# Patient Record
Sex: FEMALE | Race: WHITE | NOT HISPANIC OR LATINO | Employment: FULL TIME | URBAN - METROPOLITAN AREA
[De-identification: names, ages, dates, MRNs, and addresses within clinical notes are randomized per-mention and may not be internally consistent; named-entity substitution may affect disease eponyms.]

---

## 2022-05-09 ENCOUNTER — TELEPHONE (OUTPATIENT)
Dept: GASTROENTEROLOGY | Facility: CLINIC | Age: 62
End: 2022-05-09

## 2022-05-09 ENCOUNTER — PREP FOR SURGERY (OUTPATIENT)
Dept: OTHER | Facility: HOSPITAL | Age: 62
End: 2022-05-09

## 2022-05-09 ENCOUNTER — CLINICAL SUPPORT (OUTPATIENT)
Dept: GASTROENTEROLOGY | Facility: CLINIC | Age: 62
End: 2022-05-09

## 2022-05-09 DIAGNOSIS — Z12.11 COLON CANCER SCREENING: Primary | ICD-10-CM

## 2022-05-09 DIAGNOSIS — K21.9 CHRONIC GERD: ICD-10-CM

## 2022-05-09 RX ORDER — CETIRIZINE HYDROCHLORIDE 10 MG/1
10 TABLET ORAL DAILY
COMMUNITY

## 2022-05-09 RX ORDER — SODIUM, POTASSIUM,MAG SULFATES 17.5-3.13G
2 SOLUTION, RECONSTITUTED, ORAL ORAL TAKE AS DIRECTED
Qty: 354 ML | Refills: 0 | Status: SHIPPED | OUTPATIENT
Start: 2022-05-09 | End: 2022-09-01 | Stop reason: SDUPTHER

## 2022-05-09 RX ORDER — MELATONIN
1000 DAILY
COMMUNITY

## 2022-05-09 RX ORDER — MULTIPLE VITAMINS W/ MINERALS TAB 9MG-400MCG
1 TAB ORAL DAILY
COMMUNITY

## 2022-05-09 RX ORDER — CALCIUM CARBONATE 200(500)MG
1 TABLET,CHEWABLE ORAL DAILY
COMMUNITY

## 2022-05-09 NOTE — TELEPHONE ENCOUNTER
Heather Swanson  REASON FOR CALL encounter for colon screening/ egd  SENT IN PREP suprep  No past medical history on file.  Allergies   Allergen Reactions   • Diflucan [Fluconazole] Unknown - High Severity   • Morphine Headache     Past Surgical History:   Procedure Laterality Date   • COLONOSCOPY       Social History     Socioeconomic History   • Marital status:    Tobacco Use   • Smoking status: Never Smoker   • Smokeless tobacco: Never Used   Vaping Use   • Vaping Use: Never used   Substance and Sexual Activity   • Alcohol use: Defer   • Drug use: Defer   • Sexual activity: Defer     No family history on file.    Current Outpatient Medications:   •  calcium carbonate (TUMS) 500 MG chewable tablet, Chew 1 tablet Daily., Disp: , Rfl:   •  cetirizine (zyrTEC) 10 MG tablet, Take 10 mg by mouth Daily., Disp: , Rfl:   •  cholecalciferol (VITAMIN D3) 25 MCG (1000 UT) tablet, Take 1,000 Units by mouth Daily., Disp: , Rfl:   •  multivitamin with minerals tablet tablet, Take 1 tablet by mouth Daily., Disp: , Rfl:

## 2022-05-09 NOTE — PROGRESS NOTES
SPOKE WITH PT ON A DATE FOR COLONOSCOPY/EGD OF 22 . MADE SURE CHART WAS UP TO DATE. WENT OVER PREP AND MAILED OUT INSTRUCTIONS. PUT IN ORDER FOR COLON/EGD AND SENT PREP TO PHARMACY  Answers for HPI/ROS submitted by the patient on 2022  Please describe your symptoms.: Time for a colonoscopy, I have family history of colon cancer. I am also experiencing some heart burns and would like to have an upper scope ran as well since my father had a lot of indigestion issues and  of pancreatic cancer.  Have you had these symptoms before?: Yes  How long have you been having these symptoms?: Greater than 2 weeks  Please list any medications you are currently taking for this condition.: Zyrtec for allergies and vitamin D and a multi vitamin.  What is the primary reason for your visit?: Other

## 2022-09-01 ENCOUNTER — TELEPHONE (OUTPATIENT)
Dept: GASTROENTEROLOGY | Facility: CLINIC | Age: 62
End: 2022-09-01

## 2022-09-21 RX ORDER — VITAMIN E 268 MG
400 CAPSULE ORAL DAILY
COMMUNITY

## 2022-09-26 ENCOUNTER — ANESTHESIA (OUTPATIENT)
Dept: GASTROENTEROLOGY | Facility: HOSPITAL | Age: 62
End: 2022-09-26

## 2022-09-26 ENCOUNTER — HOSPITAL ENCOUNTER (OUTPATIENT)
Facility: HOSPITAL | Age: 62
Setting detail: HOSPITAL OUTPATIENT SURGERY
Discharge: HOME | End: 2022-09-26
Attending: INTERNAL MEDICINE | Admitting: INTERNAL MEDICINE

## 2022-09-26 ENCOUNTER — ANESTHESIA EVENT (OUTPATIENT)
Dept: GASTROENTEROLOGY | Facility: HOSPITAL | Age: 62
End: 2022-09-26

## 2022-09-26 VITALS
BODY MASS INDEX: 24.31 KG/M2 | TEMPERATURE: 98.5 F | RESPIRATION RATE: 18 BRPM | HEIGHT: 64 IN | DIASTOLIC BLOOD PRESSURE: 55 MMHG | SYSTOLIC BLOOD PRESSURE: 127 MMHG | OXYGEN SATURATION: 100 % | HEART RATE: 78 BPM | WEIGHT: 142.42 LBS

## 2022-09-26 DIAGNOSIS — Z12.11 COLON CANCER SCREENING: ICD-10-CM

## 2022-09-26 DIAGNOSIS — K21.9 CHRONIC GERD: ICD-10-CM

## 2022-09-26 PROCEDURE — 45385 COLONOSCOPY W/LESION REMOVAL: CPT | Performed by: INTERNAL MEDICINE

## 2022-09-26 PROCEDURE — 25010000002 PROPOFOL 10 MG/ML EMULSION: Performed by: NURSE ANESTHETIST, CERTIFIED REGISTERED

## 2022-09-26 PROCEDURE — 88305 TISSUE EXAM BY PATHOLOGIST: CPT | Performed by: INTERNAL MEDICINE

## 2022-09-26 PROCEDURE — 43239 EGD BIOPSY SINGLE/MULTIPLE: CPT | Performed by: INTERNAL MEDICINE

## 2022-09-26 RX ORDER — PANTOPRAZOLE SODIUM 20 MG/1
20 TABLET, DELAYED RELEASE ORAL DAILY
Qty: 90 TABLET | Refills: 0 | Status: SHIPPED | OUTPATIENT
Start: 2022-09-26 | End: 2022-11-21

## 2022-09-26 RX ORDER — SODIUM CHLORIDE, SODIUM LACTATE, POTASSIUM CHLORIDE, CALCIUM CHLORIDE 600; 310; 30; 20 MG/100ML; MG/100ML; MG/100ML; MG/100ML
1000 INJECTION, SOLUTION INTRAVENOUS CONTINUOUS
Status: DISCONTINUED | OUTPATIENT
Start: 2022-09-26 | End: 2022-09-26 | Stop reason: HOSPADM

## 2022-09-26 RX ORDER — PANTOPRAZOLE SODIUM 20 MG/1
20 TABLET, DELAYED RELEASE ORAL DAILY
Qty: 30 TABLET | Refills: 1 | Status: SHIPPED | OUTPATIENT
Start: 2022-09-26 | End: 2022-09-26

## 2022-09-26 RX ORDER — SODIUM CHLORIDE, SODIUM LACTATE, POTASSIUM CHLORIDE, CALCIUM CHLORIDE 600; 310; 30; 20 MG/100ML; MG/100ML; MG/100ML; MG/100ML
30 INJECTION, SOLUTION INTRAVENOUS CONTINUOUS
Status: DISCONTINUED | OUTPATIENT
Start: 2022-09-26 | End: 2022-09-26 | Stop reason: HOSPADM

## 2022-09-26 RX ORDER — LIDOCAINE HYDROCHLORIDE 20 MG/ML
INJECTION, SOLUTION EPIDURAL; INFILTRATION; INTRACAUDAL; PERINEURAL AS NEEDED
Status: DISCONTINUED | OUTPATIENT
Start: 2022-09-26 | End: 2022-09-26 | Stop reason: SURG

## 2022-09-26 RX ORDER — PROPOFOL 10 MG/ML
VIAL (ML) INTRAVENOUS AS NEEDED
Status: DISCONTINUED | OUTPATIENT
Start: 2022-09-26 | End: 2022-09-26 | Stop reason: SURG

## 2022-09-26 RX ADMIN — LIDOCAINE HYDROCHLORIDE 100 MG: 20 INJECTION, SOLUTION EPIDURAL; INFILTRATION; INTRACAUDAL; PERINEURAL at 07:26

## 2022-09-26 RX ADMIN — PROPOFOL 150 MCG/KG/MIN: 10 INJECTION, EMULSION INTRAVENOUS at 07:26

## 2022-09-26 RX ADMIN — PROPOFOL 100 MG: 10 INJECTION, EMULSION INTRAVENOUS at 07:26

## 2022-09-26 RX ADMIN — SODIUM CHLORIDE, POTASSIUM CHLORIDE, SODIUM LACTATE AND CALCIUM CHLORIDE 1000 ML: 600; 310; 30; 20 INJECTION, SOLUTION INTRAVENOUS at 06:35

## 2022-09-26 NOTE — H&P
"Pre Procedure History & Physical    Chief Complaint:   GERD, screening colonoscopy    Subjective     HPI:   61 yo F here for eval of GERD, screening colonoscopy.    Past Medical History:   Past Medical History:   Diagnosis Date   • Acid reflux    • Migraines        Past Surgical History:  Past Surgical History:   Procedure Laterality Date   • BLADDER REPAIR     • COLONOSCOPY     • ENDOSCOPY     • FOOT SURGERY Left    • HERNIA REPAIR     • HYSTERECTOMY Right    • WRIST SURGERY Left        Family History:  History reviewed. No pertinent family history.    Social History:   reports that she has never smoked. She has never used smokeless tobacco. Alcohol use questions deferred to the physician. Drug use questions deferred to the physician.    Medications:   Medications Prior to Admission   Medication Sig Dispense Refill Last Dose   • cetirizine (zyrTEC) 10 MG tablet Take 10 mg by mouth Daily.   9/25/2022   • calcium carbonate (TUMS) 500 MG chewable tablet Chew 1 tablet Daily.   9/24/2022   • cholecalciferol (VITAMIN D3) 25 MCG (1000 UT) tablet Take 1,000 Units by mouth Daily.   9/21/2022   • multivitamin with minerals tablet tablet Take 1 tablet by mouth Daily.   9/21/2022   • vitamin E 400 UNIT capsule Take 400 Units by mouth Daily.   9/21/2022       Allergies:  Diflucan [fluconazole] and Morphine    ROS:    Pertinent items are noted in HPI     Objective     Blood pressure 138/86, pulse 62, temperature 98.5 °F (36.9 °C), temperature source Temporal, resp. rate 16, height 162.6 cm (64\"), weight 64.6 kg (142 lb 6.7 oz), SpO2 99 %.    Physical Exam   Constitutional: Pt is oriented to person, place, and time and well-developed, well-nourished, and in no distress.   Mouth/Throat: Oropharynx is clear and moist.   Neck: Normal range of motion.   Cardiovascular: Normal rate, regular rhythm and normal heart sounds.    Pulmonary/Chest: Effort normal and breath sounds normal.   Abdominal: Soft. Nontender  Skin: Skin is warm and " dry.   Psychiatric: Mood, memory, affect and judgment normal.     Assessment & Plan     Diagnosis:  GERD, screening colonoscopy    Anticipated Surgical Procedure:  EGD/colonoscopy    The risks, benefits, and alternatives of this procedure have been discussed with the patient or the responsible party- the patient understands and agrees to proceed.

## 2022-09-26 NOTE — ANESTHESIA PREPROCEDURE EVALUATION
Anesthesia Evaluation     Patient summary reviewed and Nursing notes reviewed   no history of anesthetic complications:  NPO Solid Status: > 8 hours  NPO Liquid Status: > 2 hours           Airway   Mallampati: II  TM distance: >3 FB  Neck ROM: full  No difficulty expected  Dental      Pulmonary - negative pulmonary ROS and normal exam    breath sounds clear to auscultation  Cardiovascular - negative cardio ROS and normal exam  Exercise tolerance: good (4-7 METS)    Rhythm: regular  Rate: normal        Neuro/Psych  (+) headaches,    GI/Hepatic/Renal/Endo    (+)  GERD well controlled,      Musculoskeletal (-) negative ROS    Abdominal    Substance History - negative use     OB/GYN negative ob/gyn ROS         Other - negative ROS       ROS/Med Hx Other: PAT Nursing Notes unavailable.                   Anesthesia Plan    ASA 1     general     (Total IV Anesthesia    Patient understands anesthesia not responsible for dental damage.  )  intravenous induction     Anesthetic plan, risks, benefits, and alternatives have been provided, discussed and informed consent has been obtained with: patient.    Plan discussed with CRNA.        CODE STATUS:

## 2022-09-26 NOTE — ANESTHESIA POSTPROCEDURE EVALUATION
Patient: Heather Swanson    Procedure Summary     Date: 09/26/22 Room / Location: [facility] END[facility]COPY 1 / [facility]    Anesthesia Start: 0726 Anesthesia Stop: 0754    Procedures:       ESOPHAGOGASTRODUODENOSCOPY WITH BIOPSIES (N/A )      COLONOSCOPY WITH POLYPECTOMY, CAUTERY (N/A ) Diagnosis:       Colon cancer screening      Chronic GERD      (Colon cancer screening [Z12.11])      (Chronic GERD [K21.9])    Surgeons: Krystin Solorzano MD Provider: Mg Mary MD    Anesthesia Type: general ASA Status: 1          Anesthesia Type: general    Vitals  Vitals Value Taken Time   /55 09/26/22 0813   Temp 36.9 °C (98.5 °F) 09/26/22 0813   Pulse 78 09/26/22 0813   Resp 18 09/26/22 0813   SpO2 100 % 09/26/22 0813           Post Anesthesia Care and Evaluation    Patient location during evaluation: bedside  Patient participation: complete - patient participated  Level of consciousness: awake and alert  Pain management: adequate    Airway patency: patent  Anesthetic complications: No anesthetic complications  PONV Status: none  Cardiovascular status: acceptable  Respiratory status: acceptable  Hydration status: acceptable    Comments: An Anesthesiologist personally participated in the most demanding procedures (including induction and emergence if applicable) in the anesthesia plan, monitored the course of anesthesia administration at frequent intervals and remained physically present and available for immediate diagnosis and treatment of emergencies.

## 2022-09-27 LAB
CYTO UR: NORMAL
LAB AP CASE REPORT: NORMAL
LAB AP CLINICAL INFORMATION: NORMAL
PATH REPORT.FINAL DX SPEC: NORMAL
PATH REPORT.GROSS SPEC: NORMAL

## 2022-09-28 ENCOUNTER — TELEPHONE (OUTPATIENT)
Dept: GASTROENTEROLOGY | Facility: CLINIC | Age: 62
End: 2022-09-28

## 2022-09-28 NOTE — TELEPHONE ENCOUNTER
Spoke to pt and informed of Lori BRANHAM result note and recommendations. Verified that pt picked up new Rx of Protonix. F/u scheduled on 03/09/2023. Pt verified understanding.     5 year colon recall placed.

## 2022-09-28 NOTE — TELEPHONE ENCOUNTER
----- Message from YONAS Pineda sent at 9/27/2022  1:49 PM EDT -----  Biopsies are consistent with reflux esophagitis.  Continue current PPI therapy and schedule for follow-up.  Place in recall for repeat colonoscopy in 5 years.

## 2022-11-21 RX ORDER — PANTOPRAZOLE SODIUM 20 MG/1
20 TABLET, DELAYED RELEASE ORAL DAILY
Qty: 90 TABLET | Refills: 0 | Status: SHIPPED | OUTPATIENT
Start: 2022-11-21 | End: 2023-03-09 | Stop reason: SDUPTHER

## 2022-12-27 ENCOUNTER — TELEPHONE (OUTPATIENT)
Dept: ORTHOPEDIC SURGERY | Facility: CLINIC | Age: 62
End: 2022-12-27

## 2022-12-27 ENCOUNTER — OFFICE VISIT (OUTPATIENT)
Dept: ORTHOPEDIC SURGERY | Facility: CLINIC | Age: 62
End: 2022-12-27

## 2022-12-27 VITALS — HEART RATE: 76 BPM | BODY MASS INDEX: 24.24 KG/M2 | WEIGHT: 142 LBS | HEIGHT: 64 IN | OXYGEN SATURATION: 97 %

## 2022-12-27 DIAGNOSIS — S63.501A SPRAIN OF RIGHT WRIST, INITIAL ENCOUNTER: ICD-10-CM

## 2022-12-27 DIAGNOSIS — S52.124A CLOSED NONDISPLACED FRACTURE OF HEAD OF RIGHT RADIUS, INITIAL ENCOUNTER: Primary | ICD-10-CM

## 2022-12-27 PROCEDURE — 99203 OFFICE O/P NEW LOW 30 MIN: CPT | Performed by: ORTHOPAEDIC SURGERY

## 2022-12-27 RX ORDER — VALACYCLOVIR HYDROCHLORIDE 500 MG/1
TABLET, FILM COATED ORAL
COMMUNITY
Start: 2022-12-02

## 2022-12-27 RX ORDER — RIZATRIPTAN BENZOATE 10 MG/1
TABLET, ORALLY DISINTEGRATING ORAL
COMMUNITY
Start: 2022-12-02

## 2022-12-27 RX ORDER — DICLOFENAC SODIUM 75 MG/1
75 TABLET, DELAYED RELEASE ORAL 2 TIMES DAILY
Qty: 60 TABLET | Refills: 1 | Status: SHIPPED | OUTPATIENT
Start: 2022-12-27

## 2022-12-27 NOTE — TELEPHONE ENCOUNTER
Caller: CONTRERAS OLIVAREZ    Relationship to patient: SELF    Best call back number: [phone]    Chief complaint: RIGHT ARM FRACTURE    Type of visit: NON [facility] ER FOLLOW UP CARE    Requested date: 12/27/2022         Additional notes:PATIENT STATES SHE WAS SEEN AT [facility] ED ON 12/25/2022/  STATES RADIAL FRACTURE    HUB ATTEMPTED TO W/T NO ANSWER

## 2022-12-27 NOTE — TELEPHONE ENCOUNTER
I informed the patient we do not have any providers in the office this week and recommended [facility] in [place] I provided their telephone number and advised they do not open until 8:30am. [phone]    I informed her she will need to contact [facility] and have her images put on a disc and take that with her to the appointment since we are unable to access images only reports.     Patient voiced understanding.

## 2022-12-27 NOTE — PROGRESS NOTES
"Chief Complaint  Initial Evaluation of the Right Arm     Subjective      Heather Swanson presents to [facility] for initial evaluation of the right arm. She fell going outside on the snow and ice. She has no problems with the right shoulder.  She has swelling to elbow and pain from the elbow down to the wrist.  Her injury happened on 12/25/22.     Allergies   Allergen Reactions   • Diflucan [Fluconazole] Unknown - High Severity   • Morphine Headache        Social History     Socioeconomic History   • Marital status:    Tobacco Use   • Smoking status: Never   • Smokeless tobacco: Never   Vaping Use   • Vaping Use: Never used   Substance and Sexual Activity   • Alcohol use: Defer   • Drug use: Defer   • Sexual activity: Defer        Review of Systems     Objective   Vital Signs:   Pulse 76   Ht 162.6 cm (64\")   Wt 64.4 kg (142 lb)   SpO2 97%   BMI 24.37 kg/m²       Physical Exam  Constitutional:       Appearance: Normal appearance. Patient is well-developed and normal weight.   HENT:      Head: Normocephalic.      Right Ear: Hearing and external ear normal.      Left Ear: Hearing and external ear normal.      Nose: Nose normal.   Eyes:      Conjunctiva/sclera: Conjunctivae normal.   Cardiovascular:      Rate and Rhythm: Normal rate.   Pulmonary:      Effort: Pulmonary effort is normal.      Breath sounds: No wheezing or rales.   Abdominal:      Palpations: Abdomen is soft.      Tenderness: There is no abdominal tenderness.   Musculoskeletal:      Cervical back: Normal range of motion.   Skin:     Findings: No rash.   Neurological:      Mental Status: Patient is alert and oriented to person, place, and time.   Psychiatric:         Mood and Affect: Mood and affect normal.         Judgment: Judgment normal.       Ortho Exam      RIGHT ARM Full , thumb opposition, MCP flexors, DIP flexors and PIP flexors. Radial pulse 2+. Ulnar pulse 2+. Sensation intact. Neurovascular Intact. " Moderate swelling to the elbow.       Procedures      Imaging Results (Most Recent)     None           Result Review :         XR wrist 2 views right    Result Date: 2022  Narrative: RIGHT ELBOW HISTORY: Pain, fall. COMPARISON: None. FINDINGS:  A two view exam demonstrates no dislocation. The joint spaces demonstrates demonstrate anterior and superior displacement of the anterior fat pad of the elbow, suggesting effusion. On both views there is step-off of the articular surface of the radial head suggesting fracture. No soft tissue abnormality is seen.    Impression: Suspected, mildly displaced fracture of the right radial head with articular surface involvement and elbow effusion. RIGHT WRIST HISTORY: Pain, fall. COMPARISON: None. FINDINGS:  A two view exam demonstrates no acute fracture or dislocation. The joint spaces appear unremarkable. No soft tissue abnormality is seen. IMPRESSION: No acute bony abnormality. : 1960 Images reviewed, interpreted, and dictated by Eulalia Quinonez MD    XRAY, ELBOW, 2 VIEWS, RIGHT    Result Date: 2022  Narrative: RIGHT ELBOW HISTORY: Pain, fall. COMPARISON: None. FINDINGS:  A two view exam demonstrates no dislocation. The joint spaces demonstrates demonstrate anterior and superior displacement of the anterior fat pad of the elbow, suggesting effusion. On both views there is step-off of the articular surface of the radial head suggesting fracture. No soft tissue abnormality is seen.    Impression: Suspected, mildly displaced fracture of the right radial head with articular surface involvement and elbow effusion. RIGHT WRIST HISTORY: Pain, fall. COMPARISON: None. FINDINGS:  A two view exam demonstrates no acute fracture or dislocation. The joint spaces appear unremarkable. No soft tissue abnormality is seen. IMPRESSION: No acute bony abnormality. : 1960 Images reviewed, interpreted, and dictated by Eulalia Quinonez MD         Assessment and Plan     Diagnoses  and all orders for this visit:    1. Closed nondisplaced fracture of head of right radius, initial encounter (Primary)    2. Sprain of right wrist, initial encounter        Discussed the treatment plan with the patient. Discussed the treatment options with the patient, operative vs non-operative. Discussed conservative measures as exercises, anti-inflammatory and therapy. X ray next visit of right elbow and right wrist with a scaphoid view. Stop ibuprofen and start Voltaren.   Patient decided to continue with sling and did not want a splint.     Call or return if worsening symptoms.    Follow Up     7-10 days     Patient was given instructions and counseling regarding her condition or for health maintenance advice. Please see specific information pulled into the AVS if appropriate.     Scribed for Amadeo Stevens MD by Caro Dye MA.  12/27/22   15:13 EST    I have personally performed the services described in this document as scribed by the above individual and it is both accurate and complete. Amadeo Stevens MD 12/27/22

## 2023-01-06 ENCOUNTER — OFFICE VISIT (OUTPATIENT)
Dept: ORTHOPEDIC SURGERY | Facility: CLINIC | Age: 63
End: 2023-01-06
Payer: COMMERCIAL

## 2023-01-06 VITALS — HEIGHT: 64 IN | BODY MASS INDEX: 25.3 KG/M2 | WEIGHT: 148.2 LBS | HEART RATE: 78 BPM | OXYGEN SATURATION: 98 %

## 2023-01-06 DIAGNOSIS — M25.521 RIGHT ELBOW PAIN: ICD-10-CM

## 2023-01-06 DIAGNOSIS — M25.531 RIGHT WRIST PAIN: Primary | ICD-10-CM

## 2023-01-06 DIAGNOSIS — S52.124D CLOSED NONDISPLACED FRACTURE OF HEAD OF RIGHT RADIUS WITH ROUTINE HEALING, SUBSEQUENT ENCOUNTER: ICD-10-CM

## 2023-01-06 PROCEDURE — 99213 OFFICE O/P EST LOW 20 MIN: CPT | Performed by: ORTHOPAEDIC SURGERY

## 2023-01-06 RX ORDER — VALACYCLOVIR HYDROCHLORIDE 500 MG/1
500 TABLET, FILM COATED ORAL DAILY
COMMUNITY
Start: 2022-12-02

## 2023-01-06 RX ORDER — RIZATRIPTAN BENZOATE 10 MG/1
10 TABLET, ORALLY DISINTEGRATING ORAL
COMMUNITY
Start: 2022-12-02

## 2023-01-06 NOTE — PROGRESS NOTES
Chief Complaint  Follow-up of the Right Wrist     Subjective      Heather Swanson presents to [facility] for follow up of the right wrist. She fell going outside. She has no problems with the right shoulder.  She has swelling to elbow and pain from the elbow down to the wrist.  Her injury happened on 12/25/22. She has continued bruising to the right elbow. She is wearing a brace to the right wrist and is wearing a sling.  She has pain with pronation/supination. She cannot straighten her elbow to full extension.      Allergies   Allergen Reactions   • Diflucan [Fluconazole] Unknown - High Severity   • Morphine Headache        Social History     Socioeconomic History   • Marital status:    Tobacco Use   • Smoking status: Never   • Smokeless tobacco: Never   Vaping Use   • Vaping Use: Never used   Substance and Sexual Activity   • Alcohol use: Not Currently     Alcohol/week: 1.0 standard drink     Types: 1 Drinks containing 0.5 oz of alcohol per week   • Drug use: Never   • Sexual activity: Not Currently     Partners: Male     Birth control/protection: Vasectomy, Hysterectomy        Review of Systems     Objective   Vital Signs:   Pulse 78   Ht 162.6 cm (64\")   Wt 67.2 kg (148 lb 3.2 oz)   SpO2 98%   BMI 25.44 kg/m²       Physical Exam  Constitutional:       Appearance: Normal appearance. Patient is well-developed and normal weight.   HENT:      Head: Normocephalic.      Right Ear: Hearing and external ear normal.      Left Ear: Hearing and external ear normal.      Nose: Nose normal.   Eyes:      Conjunctiva/sclera: Conjunctivae normal.   Cardiovascular:      Rate and Rhythm: Normal rate.   Pulmonary:      Effort: Pulmonary effort is normal.      Breath sounds: No wheezing or rales.   Abdominal:      Palpations: Abdomen is soft.      Tenderness: There is no abdominal tenderness.   Musculoskeletal:      Cervical back: Normal range of motion.   Skin:     Findings: No rash.    Neurological:      Mental Status: Patient is alert and oriented to person, place, and time.   Psychiatric:         Mood and Affect: Mood and affect normal.         Judgment: Judgment normal.       Ortho Exam      RIGHT ARM Full , thumb opposition, MCP flexors, DIP flexors and PIP flexors. Radial pulse 2+. Ulnar pulse 2+. Sensation intact. Neurovascular Intact. Moderate swelling to the elbow. Elbow flexion/extension -20- 100. Lacks supination.          Procedures      Imaging Results (Most Recent)     Procedure Component Value Units Date/Time    XR Elbow 2 View Right [[id number]] Resulted: 01/06/23 0834     Updated: 01/06/23 0840    XR Wrist 3+ View Right [[id number]] Resulted: 01/06/23 0834     Updated: 01/06/23 0840           Result Review :     X-Ray Report:  Right elbow X-Ray  Indication: Evaluation of the right elbow  AP/Lateral view(s)  Findings: Routine healing of the right radial head fracture.   Prior studies available for comparison: Yes    X-Ray Report:  Right wrist  X-Ray  Indication: Evaluation of the right wrist  AP/Lateral view(s)  Findings: No acute osseous abnormality, no dislocation or fracture.   Prior studies available for comparison: Yes                Assessment and Plan     Diagnoses and all orders for this visit:    1. Right wrist pain (Primary)  -     XR Wrist 3+ View Right    2. Right elbow pain  -     XR Elbow 2 View Right    3. Closed nondisplaced fracture of head of right radius with routine healing, subsequent encounter        Discussed the treatment plan with the patient. Discussed conservative measures as exercises, anti-inflammatory and injection. She had an X ray in house today and reviewed.  She was given order for physical therapy. She was educated to work on gentle ROM of the right elbow.     Call or return if worsening symptoms.    Follow Up     2-3 weeks.       Patient was given instructions and counseling regarding her condition or for health maintenance advice. Please see  specific information pulled into the AVS if appropriate.     Scribed for Amadeo Stevens MD by Caro Dye MA.  01/06/23   08:43 EST    I have personally performed the services described in this document as scribed by the above individual and it is both accurate and complete. Amadeo Stevens MD 01/06/23

## 2023-01-09 ENCOUNTER — TELEPHONE (OUTPATIENT)
Dept: ORTHOPEDIC SURGERY | Facility: CLINIC | Age: 63
End: 2023-01-09

## 2023-01-09 NOTE — TELEPHONE ENCOUNTER
Provider: DR SAENZ    Caller: SHAINA OLIVAREZ    Relationship to Patient: SELF    Phone Number: [phone]    Reason for Call: PATIENT SAID THAT HER PHYSICAL THERAPY ORDERS WERE NOT RECEIVED BY [facility] IN [place]. THIS NEEDS TO BE REFAXED. PLEASE CALL THE PATIENT BACK WHEN IT HAS BEEN SENT.

## 2023-01-30 ENCOUNTER — OFFICE VISIT (OUTPATIENT)
Dept: ORTHOPEDIC SURGERY | Facility: CLINIC | Age: 63
End: 2023-01-30
Payer: COMMERCIAL

## 2023-01-30 VITALS — BODY MASS INDEX: 25.27 KG/M2 | HEIGHT: 64 IN | WEIGHT: 148 LBS

## 2023-01-30 DIAGNOSIS — S52.124D CLOSED NONDISPLACED FRACTURE OF HEAD OF RIGHT RADIUS WITH ROUTINE HEALING, SUBSEQUENT ENCOUNTER: Primary | ICD-10-CM

## 2023-01-30 PROCEDURE — 99213 OFFICE O/P EST LOW 20 MIN: CPT | Performed by: PHYSICIAN ASSISTANT

## 2023-01-30 NOTE — PROGRESS NOTES
"Chief Complaint  Pain and Follow-up of the Right Elbow and Pain and Follow-up of the Right Wrist    Subjective      Heather Swanson presents to [facility] for a follow up for her right elbow and right wrist. She fell on [place] where she injured her arm. She was in a sling but has been out of the sling for 3 weeks now. She has been attending physical therapy at [facility] in [place] and has been going twice a week. She reports, overall, her pain is better. She reports therapy has been working with her range of motion and some strengthening. She reports no new injury or trauma to the arm since her last visit.      Objective   Allergies   Allergen Reactions   • Diflucan [Fluconazole] Unknown - High Severity   • Morphine Headache       Vital Signs:   Ht 162.6 cm (64\")   Wt 67.1 kg (148 lb)   BMI 25.40 kg/m²       Physical Exam    Constitutional: Awake, alert. Well nourished appearance.    Integumentary: Warm, dry, intact. No obvious rashes.    HENT: Atraumatic, normocephalic.   Respiratory: Non labored respirations .   Cardiovascular: Intact peripheral pulses.    Psychiatric: Normal mood and affect. A&O X3    Ortho Exam  Right upper extremity: lacking about 20 degrees of flexion and extension of the elbow, good pronation and supination, full finger range of motion, sensation intact to the medial, radial and ulnar nerve,     Imaging Results (Most Recent)     Procedure Component Value Units Date/Time    XR Elbow 2 View Right [[id number]] Resulted: 01/31/23 1302     Updated: 01/31/23 1303    Narrative:      X-Ray Report:  Study: X-rays ordered, taken in the office, and reviewed today.   Site: Right elbow Xray  Indication: Fracture  View: AP/Lateral view(s)  Findings: Today's study shows new displacement of previously identified   radial head fracture.  Prior studies available for comparison: yes                     Assessment and Plan   Problem List Items Addressed This Visit  "   None  Visit Diagnoses     Closed nondisplaced fracture of head of right radius with routine healing, subsequent encounter    -  Primary    Relevant Orders    XR Elbow 2 View Right (Completed)        Follow Up   Discussed the treatment plan with the patient. X-rays were obtained on her right elbow today and these were reviewed with the patient today in the office. Reviewed x-rays with Dr. Stevens, given new displacement. Advised to continue present care. She will continue going to physical therapy. Advised her to avoid heavy lifting, pulling and pushing with her right arm.She will continue to do home exercises and take over the counter medications for pain. She will call with any concerns.     Return in about 3 weeks (around 2/20/2023) for Recheck with repeat x-ray on her elbow .  Patient is a non-smoker. Did not discuss options for smoking cessation.    There are no Patient Instructions on file for this visit.  Patient was given instructions and counseling regarding her condition or for health maintenance advice. Please see specific information pulled into the AVS if appropriate.

## 2023-02-03 ENCOUNTER — OFFICE VISIT (OUTPATIENT)
Dept: ORTHOPEDIC SURGERY | Facility: CLINIC | Age: 63
End: 2023-02-03
Payer: COMMERCIAL

## 2023-02-03 VITALS — WEIGHT: 148 LBS | HEIGHT: 64 IN | BODY MASS INDEX: 25.27 KG/M2

## 2023-02-03 DIAGNOSIS — S52.124D CLOSED NONDISPLACED FRACTURE OF HEAD OF RIGHT RADIUS WITH ROUTINE HEALING, SUBSEQUENT ENCOUNTER: Primary | ICD-10-CM

## 2023-02-03 PROCEDURE — 99213 OFFICE O/P EST LOW 20 MIN: CPT | Performed by: ORTHOPAEDIC SURGERY

## 2023-02-03 NOTE — PROGRESS NOTES
"Chief Complaint  Follow-up of the Right Elbow     Subjective      Heather Swanson presents to [facility] for follow up of the right elbow. She fell on [place] Day where she injured her arm. She has been attending physical therapy at [facility] in [place] and has been going twice a week. She reports, overall, her pain is better. She reports therapy has been working with her range of motion and some strengthening. She reports no new injury or trauma to the arm since her last visit.      Allergies   Allergen Reactions   • Diflucan [Fluconazole] Unknown - High Severity   • Morphine Headache        Social History     Socioeconomic History   • Marital status:    Tobacco Use   • Smoking status: Never   • Smokeless tobacco: Never   Vaping Use   • Vaping Use: Never used   Substance and Sexual Activity   • Alcohol use: Not Currently     Alcohol/week: 1.0 standard drink     Types: 1 Drinks containing 0.5 oz of alcohol per week   • Drug use: Never   • Sexual activity: Not Currently     Partners: Male     Birth control/protection: Vasectomy, Hysterectomy        Review of Systems     Objective   Vital Signs:   Ht 162.6 cm (64\")   Wt 67.1 kg (148 lb)   BMI 25.40 kg/m²       Physical Exam  Constitutional:       Appearance: Normal appearance. Patient is well-developed and normal weight.   HENT:      Head: Normocephalic.      Right Ear: Hearing and external ear normal.      Left Ear: Hearing and external ear normal.      Nose: Nose normal.   Eyes:      Conjunctiva/sclera: Conjunctivae normal.   Cardiovascular:      Rate and Rhythm: Normal rate.   Pulmonary:      Effort: Pulmonary effort is normal.      Breath sounds: No wheezing or rales.   Abdominal:      Palpations: Abdomen is soft.      Tenderness: There is no abdominal tenderness.   Musculoskeletal:      Cervical back: Normal range of motion.   Skin:     Findings: No rash.   Neurological:      Mental Status: Patient is alert and oriented to " person, place, and time.   Psychiatric:         Mood and Affect: Mood and affect normal.         Judgment: Judgment normal.       Ortho Exam      Right upper extremity: lacking about 20 degrees of flexion and extension of the elbow, good pronation and supination, full finger range of motion, sensation intact to the medial, radial and ulnar nerve, radial pulse 2+. Ulnar pulse 2+. Sensation intact. Neurovascular Intact. Pronation/supination of wrist with slight discomfort.     Procedures      Imaging Results (Most Recent)     None           Result Review :       XR Elbow 2 View Right    Result Date: 1/31/2023  Narrative: X-Ray Report: Study: X-rays ordered, taken in the office, and reviewed today. Site: Right elbow Xray Indication: Fracture View: AP/Lateral view(s) Findings: Today's study shows new displacement of previously identified radial head fracture. Prior studies available for comparison: yes     XR Elbow 2 View Right    Result Date: 1/6/2023  Narrative: X-Ray Report: Right elbow X-Ray Indication: Evaluation of the right elbow AP/Lateral view(s) Findings: Routine healing of the right radial head fracture. Prior studies available for comparison: Yes      XR Wrist 3+ View Right    Result Date: 1/6/2023  Narrative: X-Ray Report: Right wrist  X-Ray Indication: Evaluation of the right wrist AP/Lateral view(s) Findings: No acute osseous abnormality, no dislocation or fracture. Prior studies available for comparison: Yes             Assessment and Plan     Diagnoses and all orders for this visit:    1. Closed nondisplaced fracture of head of right radius with routine healing, subsequent encounter (Primary)        Discussed the treatment plan with the patient. I reviewed the X-rays that were obtained 1/31/23 with the patient. Continue physical therapy.     Call or return if worsening symptoms.    Follow Up     3 weeks with X ray    Patient was given instructions and counseling regarding her condition or for health  maintenance advice. Please see specific information pulled into the AVS if appropriate.     Scribed for Amadeo Stevens MD by Caro Dye MA.  02/03/23   08:14 EST    I have personally performed the services described in this document as scribed by the above individual and it is both accurate and complete. Amadeo Stevens MD 02/03/23

## 2023-02-24 ENCOUNTER — OFFICE VISIT (OUTPATIENT)
Dept: ORTHOPEDIC SURGERY | Facility: CLINIC | Age: 63
End: 2023-02-24
Payer: COMMERCIAL

## 2023-02-24 VITALS — HEIGHT: 64 IN | BODY MASS INDEX: 25.27 KG/M2 | WEIGHT: 148 LBS

## 2023-02-24 DIAGNOSIS — S52.124D CLOSED NONDISPLACED FRACTURE OF HEAD OF RIGHT RADIUS WITH ROUTINE HEALING, SUBSEQUENT ENCOUNTER: Primary | ICD-10-CM

## 2023-02-24 PROCEDURE — 99213 OFFICE O/P EST LOW 20 MIN: CPT | Performed by: ORTHOPAEDIC SURGERY

## 2023-02-24 NOTE — PROGRESS NOTES
"Chief Complaint  Pain and Follow-up of the Right Elbow     Subjective      Heather Swanson presents to [facility] for follow up of the right elbow. She fell on [place] Day where she injured her arm. She has been attending physical therapy at [facility] in [place] and has been going twice a week. She reports, overall, her pain is better. She reports therapy has been working with her range of motion and some strengthening. She reports no new injury or trauma to the arm since her last visit.         Allergies   Allergen Reactions   • Diflucan [Fluconazole] Unknown - High Severity   • Morphine Headache        Social History     Socioeconomic History   • Marital status:    Tobacco Use   • Smoking status: Never   • Smokeless tobacco: Never   Vaping Use   • Vaping Use: Never used   Substance and Sexual Activity   • Alcohol use: Not Currently     Alcohol/week: 1.0 standard drink     Types: 1 Drinks containing 0.5 oz of alcohol per week   • Drug use: Never   • Sexual activity: Not Currently     Partners: Male     Birth control/protection: Vasectomy, Hysterectomy        Review of Systems     Objective   Vital Signs:   Ht 162.6 cm (64\")   Wt 67.1 kg (148 lb)   BMI 25.40 kg/m²       Physical Exam  Constitutional:       Appearance: Normal appearance. Patient is well-developed and normal weight.   HENT:      Head: Normocephalic.      Right Ear: Hearing and external ear normal.      Left Ear: Hearing and external ear normal.      Nose: Nose normal.   Eyes:      Conjunctiva/sclera: Conjunctivae normal.   Cardiovascular:      Rate and Rhythm: Normal rate.   Pulmonary:      Effort: Pulmonary effort is normal.      Breath sounds: No wheezing or rales.   Abdominal:      Palpations: Abdomen is soft.      Tenderness: There is no abdominal tenderness.   Musculoskeletal:      Cervical back: Normal range of motion.   Skin:     Findings: No rash.   Neurological:      Mental Status: Patient is alert and " oriented to person, place, and time.   Psychiatric:         Mood and Affect: Mood and affect normal.         Judgment: Judgment normal.       Ortho Exam      Right upper extremity: lacking about 5 degrees of flexion and extension of the elbow, good pronation and supination, full finger range of motion, sensation intact to the medial, radial and ulnar nerve, radial pulse 2+. Ulnar pulse 2+. Sensation intact. Neurovascular Intact. Pronation/supination of wrist with slight discomfort.       Procedures      Imaging Results (Most Recent)     Procedure Component Value Units Date/Time    XR Elbow 2 View Right [[id number]] Resulted: 02/24/23 0808     Updated: 02/24/23 0808           Result Review :     X-Ray Report:  Right elbow X-Ray  Indication: Evaluation of the right elbow.   AP/Lateral view(s)  Findings: Routine healing of right radial head fracture.   Prior studies available for comparison: Yes             Assessment and Plan     Diagnoses and all orders for this visit:    1. Closed nondisplaced fracture of head of right radius with routine healing, subsequent encounter (Primary)  -     XR Elbow 2 View Right      Discussed the treatment plan with the patient. I reviewed the X-rays that were obtained today with the patient. Continue physical therapy as feels the need..  Modify activities as needed.     Call or return if worsening symptoms.    Follow Up     PRN      Patient was given instructions and counseling regarding her condition or for health maintenance advice. Please see specific information pulled into the AVS if appropriate.     Scribed for Amadeo Stevens MD by Caro Dye MA.  02/24/23   08:11 EST    I have personally performed the services described in this document as scribed by the above individual and it is both accurate and complete. Amadeo Stevens MD 02/24/23

## 2023-03-09 ENCOUNTER — OFFICE VISIT (OUTPATIENT)
Dept: GASTROENTEROLOGY | Facility: CLINIC | Age: 63
End: 2023-03-09
Payer: COMMERCIAL

## 2023-03-09 VITALS
WEIGHT: 141.2 LBS | HEART RATE: 81 BPM | DIASTOLIC BLOOD PRESSURE: 84 MMHG | SYSTOLIC BLOOD PRESSURE: 128 MMHG | HEIGHT: 64 IN | BODY MASS INDEX: 24.11 KG/M2

## 2023-03-09 DIAGNOSIS — K20.90 ESOPHAGITIS: Primary | ICD-10-CM

## 2023-03-09 DIAGNOSIS — K63.5 HYPERPLASTIC POLYP OF ASCENDING COLON: ICD-10-CM

## 2023-03-09 PROBLEM — G43.909 MIGRAINE HEADACHE: Status: ACTIVE | Noted: 2020-01-02

## 2023-03-09 PROCEDURE — 99213 OFFICE O/P EST LOW 20 MIN: CPT | Performed by: NURSE PRACTITIONER

## 2023-03-09 RX ORDER — PANTOPRAZOLE SODIUM 20 MG/1
20 TABLET, DELAYED RELEASE ORAL DAILY
Qty: 90 TABLET | Refills: 3 | Status: SHIPPED | OUTPATIENT
Start: 2023-03-09

## 2023-03-09 NOTE — PROGRESS NOTES
Chief Complaint     Reflux Esophagitis  and Follow-up (EGD and Colonoscopy )    History of Present Illness     Heather Swanson is a 62 y.o. female who presents to [facility] for follow-up of heartburn.      She reports that reflux is much better controlled with protonix.  She is taking 20 mg each day.  She will occasionally wake with heartburn that's relieved with TUMS.  Denies dysphagia.       History      Past Medical History:   Diagnosis Date   • Acid reflux    • Migraines      Past Surgical History:   Procedure Laterality Date   • BLADDER REPAIR     • COLONOSCOPY     • COLONOSCOPY N/A 09/26/2022    Procedure: COLONOSCOPY WITH POLYPECTOMY, CAUTERY;  Surgeon: Krsytin Solorzano MD;  Location: [facility];  Service: Gastroenterology;  Laterality: N/A;  COLON POLYP   • ENDOSCOPY     • ENDOSCOPY N/A 09/26/2022    Procedure: ESOPHAGOGASTRODUODENOSCOPY WITH BIOPSIES;  Surgeon: Krystin Solorzano MD;  Location: [facility] ENDOSCOPY;  Service: Gastroenterology;  Laterality: N/A;  HIATAL HERNIA   • FOOT SURGERY Left    • HERNIA REPAIR     • HYSTERECTOMY Right    • WRIST SURGERY Left      Family History   Problem Relation Age of Onset   • Osteoporosis Mother    • Cancer Father         Cancer in the colon, kidney, prostate and pancreatic   • Cancer Sister         Breast Cancer   • Cancer Brother         Kidney cancer   • Osteoporosis Sister         Current Medications       Current Outpatient Medications:   •  calcium carbonate (TUMS) 500 MG chewable tablet, Chew 1 tablet Daily., Disp: , Rfl:   •  cetirizine (zyrTEC) 10 MG tablet, Take 1 tablet by mouth Daily., Disp: , Rfl:   •  cholecalciferol (VITAMIN D3) 25 MCG (1000 UT) tablet, Take 1 tablet by mouth Daily., Disp: , Rfl:   •  diclofenac (VOLTAREN) 75 MG EC tablet, Take 1 tablet by mouth 2 (Two) Times a Day., Disp: 60 tablet, Rfl: 1  •  multivitamin with minerals tablet tablet, Take 1 tablet by mouth Daily., Disp: , Rfl:   •   "pantoprazole (PROTONIX) 20 MG EC tablet, Take 1 tablet by mouth Daily., Disp: 90 tablet, Rfl: 3  •  rizatriptan MLT (MAXALT-MLT) 10 MG disintegrating tablet, TAKE 1 TABLET BY MOUTH AS NEEDED. MAY REPEAT IN 2 HOURS IF NEEDED, Disp: , Rfl:   •  rizatriptan MLT (MAXALT-MLT) 10 MG disintegrating tablet, Take 1 tablet by mouth., Disp: , Rfl:   •  valACYclovir (VALTREX) 500 MG tablet, , Disp: , Rfl:   •  valACYclovir (VALTREX) 500 MG tablet, Take 1 tablet by mouth Daily., Disp: , Rfl:   •  vitamin E 400 UNIT capsule, Take 1 capsule by mouth Daily., Disp: , Rfl:      Allergies     Allergies   Allergen Reactions   • Diflucan [Fluconazole] Unknown - High Severity   • Morphine Headache       Social History       Social History     Social History Narrative   • Not on file         Objective       /84 (BP Location: Left arm, Patient Position: Sitting, Cuff Size: Adult)   Pulse 81   Ht 162.6 cm (64\")   Wt 64 kg (141 lb 3.2 oz)   BMI 24.24 kg/m²       Physical Exam    Results       Result Review :    The following data was reviewed by: YONAS Pineda on 03/09/2023:    9/26/2022 EGD-mild Schatzki's ring found at the GE junction, biopsies-reflux esophagitis.  2 cm hiatal hernia.  Entire examined stomach was normal.  Gastric antrum biopsy-normal.  Normal duodenum.Screening for colorectal cancer-perianal and digital rectal exams were normal.  Terminal ileum appeared normal.  8 mm polyp in the ascending colon-hyperplastic, completely removed.                   Assessment and Plan              Diagnoses and all orders for this visit:    1. Esophagitis (Primary)  -     pantoprazole (PROTONIX) 20 MG EC tablet; Take 1 tablet by mouth Daily.  Dispense: 90 tablet; Refill: 3    2. Hyperplastic polyp of ascending colon            Follow Up     Follow Up   Return if symptoms worsen or fail to improve, for GERD.  Patient was given instructions and counseling regarding her condition or for health maintenance advice. Please " see specific information pulled into the AVS if appropriate.

## 2023-03-09 NOTE — PATIENT INSTRUCTIONS
Food Choices for Gastroesophageal Reflux Disease, Adult  When you have gastroesophageal reflux disease (GERD), the foods you eat and your eating habits are very important. Choosing the right foods can help ease your discomfort. Think about working with a food expert (dietitian) to help you make good choices.  What are tips for following this plan?  Reading food labels  Look for foods that are low in saturated fat. Foods that may help with your symptoms include:  Foods that have less than 5% of daily value (DV) of fat.  Foods that have 0 grams of trans fat.  Cooking  Do not hernandez your food.  Cook your food by baking, steaming, grilling, or broiling. These are all methods that do not need a lot of fat for cooking.  To add flavor, try to use herbs that are low in spice and acidity.  Meal planning    Choose healthy foods that are low in fat, such as:  Fruits and vegetables.  Whole grains.  Low-fat dairy products.  Lean meats, fish, and poultry.  Eat small meals often instead of eating 3 large meals each day. Eat your meals slowly in a place where you are relaxed. Avoid bending over or lying down until 2-3 hours after eating.  Limit high-fat foods such as fatty meats or fried foods.  Limit your intake of fatty foods, such as oils, butter, and shortening.  Avoid the following as told by your doctor:  Foods that cause symptoms. These may be different for different people. Keep a food diary to keep track of foods that cause symptoms.  Alcohol.  Drinking a lot of liquid with meals.  Eating meals during the 2-3 hours before bed.  Lifestyle  Stay at a healthy weight. Ask your doctor what weight is healthy for you. If you need to lose weight, work with your doctor to do so safely.  Exercise for at least 30 minutes on 5 or more days each week, or as told by your doctor.  Wear loose-fitting clothes.  Do not smoke or use any products that contain nicotine or tobacco. If you need help quitting, ask your doctor.  Sleep with the head  of your bed higher than your feet. Use a wedge under the mattress or blocks under the bed frame to raise the head of the bed.  Chew sugar-free gum after meals.  What foods should eat?  Eat a healthy, well-balanced diet of fruits, vegetables, whole grains, low-fat dairy products, lean meats, fish, and poultry. Each person is different.  Foods that may cause symptoms in one person may not cause any symptoms in another person. Work with your doctor to find foods that are safe for you.  The items listed above may not be a complete list of what you can eat and drink. Contact a food expert for more options.  What foods should I avoid?  Limiting some of these foods may help in managing the symptoms of GERD. Everyone is different. Talk with a food expert or your doctor to help you find the exact foods to avoid, if any.  Fruits  Any fruits prepared with added fat. Any fruits that cause symptoms. For some people, this may include citrus fruits, such as oranges, grapefruit, pineapple, and randal.  Vegetables  Deep-fried vegetables. French fries. Any vegetables prepared with added fat. Any vegetables that cause symptoms. For some people, this may include tomatoes and tomato products, chili peppers, onions and garlic, and horseradish.  Grains  Pastries or quick breads with added fat.  Meats and other proteins  High-fat meats, such as fatty beef or pork, hot dogs, ribs, ham, sausage, salami, and mobley. Fried meat or protein, including fried fish and fried chicken. Nuts and nut butters, in large amounts.  Dairy  Whole milk and chocolate milk. Sour cream. Cream. Ice cream. Cream cheese. Milkshakes.  Fats and oils  Butter. Margarine. Shortening. Ghee.  Beverages  Coffee and tea, with or without caffeine. Carbonated beverages. Sodas. Energy drinks. Fruit juice made with acidic fruits, such as orange or grapefruit. Tomato juice. Alcoholic drinks.  Sweets and desserts  Chocolate and cocoa. Donuts.  Seasonings and condiments  Pepper.  Peppermint and spearmint. Added salt. Any condiments, herbs, or seasonings that cause symptoms. For some people, this may include olvera, hot sauce, or vinegar-based salad dressings.  The items listed above may not be a complete list of what you should not eat and drink. Contact a food expert for more options.  Questions to ask your doctor  Diet and lifestyle changes are often the first steps that are taken to manage symptoms of GERD. If diet and lifestyle changes do not help, talk with your doctor about taking medicines.  Where to find more information  International [employer] for Gastrointestinal Disorders: aboutgerd.org  Summary  When you have GERD, food and lifestyle choices are very important in easing your symptoms.  Eat small meals often instead of 3 large meals a day. Eat your meals slowly and in a place where you are relaxed.  Avoid bending over or lying down until 2-3 hours after eating.  Limit high-fat foods such as fatty meats or fried foods.  This information is not intended to replace advice given to you by your health care provider. Make sure you discuss any questions you have with your health care provider.  Document Revised: 06/28/2021 Document Reviewed: 06/28/2021  Elsevier Patient Education © 2022 Else[employer] Inc[employer]

## 2023-05-24 DIAGNOSIS — K20.90 ESOPHAGITIS: ICD-10-CM

## 2023-05-24 NOTE — TELEPHONE ENCOUNTER
Pt is requesting pantoprazole #90 [id number]. Order pended.   Last ov: 3/9/23  Next ov: none  Last refill: 3/9/23

## 2023-05-26 RX ORDER — PANTOPRAZOLE SODIUM 20 MG/1
20 TABLET, DELAYED RELEASE ORAL DAILY
Qty: 90 TABLET | Refills: 3 | Status: SHIPPED | OUTPATIENT
Start: 2023-05-26

## 2024-07-11 ENCOUNTER — OFFICE VISIT (OUTPATIENT)
Dept: FAMILY MEDICINE CLINIC | Age: 64
End: 2024-07-11
Payer: COMMERCIAL

## 2024-07-11 VITALS
BODY MASS INDEX: 24.82 KG/M2 | DIASTOLIC BLOOD PRESSURE: 86 MMHG | HEART RATE: 68 BPM | HEIGHT: 64 IN | WEIGHT: 145.4 LBS | OXYGEN SATURATION: 100 % | SYSTOLIC BLOOD PRESSURE: 130 MMHG

## 2024-07-11 DIAGNOSIS — G43.919 INTRACTABLE MIGRAINE WITHOUT STATUS MIGRAINOSUS, UNSPECIFIED MIGRAINE TYPE: Primary | ICD-10-CM

## 2024-07-11 DIAGNOSIS — Z11.59 NEED FOR HEPATITIS C SCREENING TEST: ICD-10-CM

## 2024-07-11 DIAGNOSIS — Z13.1 SCREENING FOR DIABETES MELLITUS: ICD-10-CM

## 2024-07-11 DIAGNOSIS — Z13.220 SCREENING, LIPID: ICD-10-CM

## 2024-07-11 DIAGNOSIS — E55.9 VITAMIN D DEFICIENCY: ICD-10-CM

## 2024-07-11 DIAGNOSIS — Z13.820 SCREENING FOR OSTEOPOROSIS: ICD-10-CM

## 2024-07-11 DIAGNOSIS — R63.5 WEIGHT GAIN: ICD-10-CM

## 2024-07-11 DIAGNOSIS — K20.90 ESOPHAGITIS: ICD-10-CM

## 2024-07-11 PROBLEM — Z15.09 GENETIC PREDISPOSITION TO CANCER: Status: ACTIVE | Noted: 2021-03-29

## 2024-07-11 PROCEDURE — 99214 OFFICE O/P EST MOD 30 MIN: CPT | Performed by: NURSE PRACTITIONER

## 2024-07-11 RX ORDER — PANTOPRAZOLE SODIUM 20 MG/1
20 TABLET, DELAYED RELEASE ORAL DAILY
Qty: 90 TABLET | Refills: 3 | Status: SHIPPED | OUTPATIENT
Start: 2024-07-11

## 2024-07-11 RX ORDER — RIZATRIPTAN BENZOATE 10 MG/1
10 TABLET, ORALLY DISINTEGRATING ORAL ONCE AS NEEDED
Qty: 30 TABLET | Refills: 1 | Status: SHIPPED | OUTPATIENT
Start: 2024-07-11

## 2024-07-11 NOTE — PROGRESS NOTES
Chief Complaint  Establish Care    Subjective        Heather Swanson presents to [facility] today to establish care with new PCP.  Was seeing Michelle Valdez, but the office they are closed.    History of reflux, followed by Dr. Solorzano.  Had EGD and colonoscopy 9/20/2022 which showed mild gastritis a few sessile polyps which were benign due to repeat in 5 years.  Managed on Protonix 20 mg daily and the occasional Tums if needed.    Seen by GYN YONAS Baxter, status post hysterectomy, no longer needs Pap smears, last visit was December 2023 with mammogram which was abnormal, and had repeat diagnostic mammogram which was normal.  Last bone density was over 2 years ago due for repeat.    Regarding vaccines, had flu vaccine last year, had 2 COVID vaccines with 1 booster declines any further COVID vaccines.  Discussed need for shingles vaccine, will get at pharmacy.  Last Tdap about 2 to 3 years ago.    Chronic history significant for migraines, improved with Maxalt as needed, they do not occur frequently and have been controlled in the past.  No history of hypertension, high cholesterol or diabetes.        Current Outpatient Medications:     cholecalciferol (VITAMIN D3) 25 MCG (1000 UT) tablet, Take 1 tablet by mouth Daily., Disp: , Rfl:     multivitamin with minerals tablet tablet, Take 1 tablet by mouth Daily., Disp: , Rfl:     pantoprazole (PROTONIX) 20 MG EC tablet, Take 1 tablet by mouth Daily., Disp: 90 tablet, Rfl: 3    rizatriptan MLT (MAXALT-MLT) 10 MG disintegrating tablet, Place 1 tablet on the tongue 1 (One) Time As Needed for Migraine for up to 60 doses. May repeat in 2 hours if needed, Disp: 30 tablet, Rfl: 1    valACYclovir (VALTREX) 500 MG tablet, Take 1 tablet by mouth Daily., Disp: , Rfl:     vitamin E 400 UNIT capsule, Take 1 capsule by mouth Daily., Disp: , Rfl:     calcium carbonate (TUMS) 500 MG chewable tablet, Chew 1 tablet Daily. (Patient not taking:  "Reported on 7/11/2024), Disp: , Rfl:   Medications Discontinued During This Encounter   Medication Reason    cetirizine (zyrTEC) 10 MG tablet *Therapy completed    diclofenac (VOLTAREN) 75 MG EC tablet *Therapy completed    rizatriptan MLT (MAXALT-MLT) 10 MG disintegrating tablet Duplicate order    valACYclovir (VALTREX) 500 MG tablet Duplicate order    rizatriptan MLT (MAXALT-MLT) 10 MG disintegrating tablet Reorder    pantoprazole (PROTONIX) 20 MG EC tablet Reorder         Allergies:  Diflucan [fluconazole] and Morphine      Objective   Vital Signs:   Vitals:    07/11/24 1554   BP: 130/86   BP Location: Left arm   Patient Position: Sitting   Pulse: 68   SpO2: 100%  Comment: room air   Weight: 66 kg (145 lb 6.4 oz)   Height: 162.6 cm (64\")     Body mass index is 24.96 kg/m².  BMI is within normal parameters. No other follow-up for BMI required.        Physical Exam  Constitutional:       Appearance: Normal appearance.   Neck:      Vascular: No carotid bruit.   Cardiovascular:      Rate and Rhythm: Normal rate and regular rhythm.      Heart sounds: Normal heart sounds.   Pulmonary:      Effort: Pulmonary effort is normal.      Breath sounds: Normal breath sounds.   Musculoskeletal:         General: Normal range of motion.   Skin:     General: Skin is warm and dry.   Neurological:      General: No focal deficit present.      Mental Status: She is alert.   Psychiatric:         Mood and Affect: Mood normal.         Behavior: Behavior normal.                         Procedures         Diagnoses and all orders for this visit:    1. Intractable migraine without status migrainosus, unspecified migraine type (Primary)  -     rizatriptan MLT (MAXALT-MLT) 10 MG disintegrating tablet; Place 1 tablet on the tongue 1 (One) Time As Needed for Migraine for up to 60 doses. May repeat in 2 hours if needed  Dispense: 30 tablet; Refill: 1    2. Esophagitis  -     pantoprazole (PROTONIX) 20 MG EC tablet; Take 1 tablet by mouth Daily.  " Dispense: 90 tablet; Refill: 3    3. Weight gain  -     Comprehensive Metabolic Panel; Future  -     CBC & Differential; Future  -     TSH Rfx On Abnormal To Free T4; Future    4. Vitamin D deficiency  -     Vitamin D,25-Hydroxy; Future    5. Screening for osteoporosis  -     DEXA Bone Density Axial; Future    6. Need for hepatitis C screening test  -     Hepatitis C Antibody; Future    7. Screening for diabetes mellitus  -     Hemoglobin A1c; Future    8. Screening, lipid  -     Lipid Panel; Future            Follow Up  Return in about 6 months (around 1/11/2025) for Annual physical, will call with lab results.  Patient was given instructions and counseling regarding her condition or for health maintenance advice. Please see specific information pulled into the AVS if appropriate.           Jeannine Novoa, APRN  07/11/2024    Please note that portions of this document were completed using a voice recognition program.

## 2024-07-22 ENCOUNTER — LAB (OUTPATIENT)
Dept: LAB | Facility: HOSPITAL | Age: 64
End: 2024-07-22
Payer: COMMERCIAL

## 2024-07-22 DIAGNOSIS — Z13.1 SCREENING FOR DIABETES MELLITUS: ICD-10-CM

## 2024-07-22 DIAGNOSIS — Z13.220 SCREENING, LIPID: ICD-10-CM

## 2024-07-22 DIAGNOSIS — R63.5 WEIGHT GAIN: ICD-10-CM

## 2024-07-22 DIAGNOSIS — E55.9 VITAMIN D DEFICIENCY: ICD-10-CM

## 2024-07-22 DIAGNOSIS — Z11.59 NEED FOR HEPATITIS C SCREENING TEST: ICD-10-CM

## 2024-07-22 LAB
25(OH)D3 SERPL-MCNC: 69.1 NG/ML (ref 30–100)
ALBUMIN SERPL-MCNC: 4.6 G/DL (ref 3.5–5.2)
ALBUMIN/GLOB SERPL: 1.8 G/DL
ALP SERPL-CCNC: 72 U/L (ref 39–117)
ALT SERPL W P-5'-P-CCNC: 16 U/L (ref 1–33)
ANION GAP SERPL CALCULATED.3IONS-SCNC: 12.7 MMOL/L (ref 5–15)
AST SERPL-CCNC: 20 U/L (ref 1–32)
BASOPHILS # BLD AUTO: 0.03 10*3/MM3 (ref 0–0.2)
BASOPHILS NFR BLD AUTO: 0.6 % (ref 0–1.5)
BILIRUB SERPL-MCNC: 0.4 MG/DL (ref 0–1.2)
BUN SERPL-MCNC: 18 MG/DL (ref 8–23)
BUN/CREAT SERPL: 20.5 (ref 7–25)
CALCIUM SPEC-SCNC: 9.5 MG/DL (ref 8.6–10.5)
CHLORIDE SERPL-SCNC: 103 MMOL/L (ref 98–107)
CHOLEST SERPL-MCNC: 226 MG/DL (ref 0–200)
CO2 SERPL-SCNC: 27.3 MMOL/L (ref 22–29)
CREAT SERPL-MCNC: 0.88 MG/DL (ref 0.57–1)
DEPRECATED RDW RBC AUTO: 41.1 FL (ref 37–54)
EGFRCR SERPLBLD CKD-EPI 2021: 74.4 ML/MIN/1.73
EOSINOPHIL # BLD AUTO: 0.04 10*3/MM3 (ref 0–0.4)
EOSINOPHIL NFR BLD AUTO: 0.9 % (ref 0.3–6.2)
ERYTHROCYTE [DISTWIDTH] IN BLOOD BY AUTOMATED COUNT: 12.2 % (ref 12.3–15.4)
GLOBULIN UR ELPH-MCNC: 2.5 GM/DL
GLUCOSE SERPL-MCNC: 93 MG/DL (ref 65–99)
HBA1C MFR BLD: 5.5 % (ref 4.8–5.6)
HCT VFR BLD AUTO: 41 % (ref 34–46.6)
HCV AB SER QL: NORMAL
HDLC SERPL-MCNC: 69 MG/DL (ref 40–60)
HGB BLD-MCNC: 13.8 G/DL (ref 12–15.9)
IMM GRANULOCYTES # BLD AUTO: 0.01 10*3/MM3 (ref 0–0.05)
IMM GRANULOCYTES NFR BLD AUTO: 0.2 % (ref 0–0.5)
LDLC SERPL CALC-MCNC: 137 MG/DL (ref 0–100)
LDLC/HDLC SERPL: 1.94 {RATIO}
LYMPHOCYTES # BLD AUTO: 1.38 10*3/MM3 (ref 0.7–3.1)
LYMPHOCYTES NFR BLD AUTO: 29.5 % (ref 19.6–45.3)
MCH RBC QN AUTO: 30.6 PG (ref 26.6–33)
MCHC RBC AUTO-ENTMCNC: 33.7 G/DL (ref 31.5–35.7)
MCV RBC AUTO: 90.9 FL (ref 79–97)
MONOCYTES # BLD AUTO: 0.45 10*3/MM3 (ref 0.1–0.9)
MONOCYTES NFR BLD AUTO: 9.6 % (ref 5–12)
NEUTROPHILS NFR BLD AUTO: 2.77 10*3/MM3 (ref 1.7–7)
NEUTROPHILS NFR BLD AUTO: 59.2 % (ref 42.7–76)
PLATELET # BLD AUTO: 220 10*3/MM3 (ref 140–450)
PMV BLD AUTO: 10.1 FL (ref 6–12)
POTASSIUM SERPL-SCNC: 3.9 MMOL/L (ref 3.5–5.2)
PROT SERPL-MCNC: 7.1 G/DL (ref 6–8.5)
RBC # BLD AUTO: 4.51 10*6/MM3 (ref 3.77–5.28)
SODIUM SERPL-SCNC: 143 MMOL/L (ref 136–145)
TRIGL SERPL-MCNC: 114 MG/DL (ref 0–150)
TSH SERPL DL<=0.05 MIU/L-ACNC: 2.68 UIU/ML (ref 0.27–4.2)
VLDLC SERPL-MCNC: 20 MG/DL (ref 5–40)
WBC NRBC COR # BLD AUTO: 4.68 10*3/MM3 (ref 3.4–10.8)

## 2024-07-22 PROCEDURE — 82306 VITAMIN D 25 HYDROXY: CPT

## 2024-07-22 PROCEDURE — 80061 LIPID PANEL: CPT

## 2024-07-22 PROCEDURE — 36415 COLL VENOUS BLD VENIPUNCTURE: CPT

## 2024-07-22 PROCEDURE — 86803 HEPATITIS C AB TEST: CPT

## 2024-07-22 PROCEDURE — 83036 HEMOGLOBIN GLYCOSYLATED A1C: CPT

## 2024-07-22 PROCEDURE — 80050 GENERAL HEALTH PANEL: CPT

## 2024-07-24 DIAGNOSIS — E78.00 ELEVATED CHOLESTEROL: Primary | ICD-10-CM

## 2024-07-24 NOTE — PROGRESS NOTES
CBC normal, CMP which looks at the kidneys and liver is normal, TSH is normal, hepatitis C screening was negative, vitamin D looks good, lipid mildly elevated , this may have been nonfasting, so I would encourage a low-cholesterol diet and repeat fasting lipid in 1 month

## 2024-08-09 ENCOUNTER — HOSPITAL ENCOUNTER (OUTPATIENT)
Dept: BONE DENSITY | Facility: HOSPITAL | Age: 64
Discharge: HOME | End: 2024-08-09
Admitting: NURSE PRACTITIONER
Payer: COMMERCIAL

## 2024-08-09 DIAGNOSIS — Z13.820 SCREENING FOR OSTEOPOROSIS: ICD-10-CM

## 2024-08-09 PROCEDURE — 77080 DXA BONE DENSITY AXIAL: CPT

## 2024-08-12 NOTE — PROGRESS NOTES
Osteopenia noted on DEXA scan, repeat 2 years.  Needs to be taking calcium with vitamin D 1 tablet twice daily, not at the same time must be separate dosing and continue weightbearing exercises.

## 2024-08-23 ENCOUNTER — LAB (OUTPATIENT)
Dept: LAB | Facility: HOSPITAL | Age: 64
End: 2024-08-23
Payer: COMMERCIAL

## 2024-08-23 DIAGNOSIS — E78.00 ELEVATED CHOLESTEROL: ICD-10-CM

## 2024-08-23 LAB
ALBUMIN SERPL-MCNC: 4.4 G/DL (ref 3.5–5.2)
ALBUMIN/GLOB SERPL: 1.7 G/DL
ALP SERPL-CCNC: 70 U/L (ref 39–117)
ALT SERPL W P-5'-P-CCNC: 13 U/L (ref 1–33)
ANION GAP SERPL CALCULATED.3IONS-SCNC: 9 MMOL/L (ref 5–15)
AST SERPL-CCNC: 20 U/L (ref 1–32)
BILIRUB SERPL-MCNC: 0.5 MG/DL (ref 0–1.2)
BUN SERPL-MCNC: 11 MG/DL (ref 8–23)
BUN/CREAT SERPL: 13.3 (ref 7–25)
CALCIUM SPEC-SCNC: 9.4 MG/DL (ref 8.6–10.5)
CHLORIDE SERPL-SCNC: 103 MMOL/L (ref 98–107)
CHOLEST SERPL-MCNC: 196 MG/DL (ref 0–200)
CO2 SERPL-SCNC: 29 MMOL/L (ref 22–29)
CREAT SERPL-MCNC: 0.83 MG/DL (ref 0.57–1)
EGFRCR SERPLBLD CKD-EPI 2021: 79.8 ML/MIN/1.73
GLOBULIN UR ELPH-MCNC: 2.6 GM/DL
GLUCOSE SERPL-MCNC: 91 MG/DL (ref 65–99)
HDLC SERPL-MCNC: 64 MG/DL (ref 40–60)
LDLC SERPL CALC-MCNC: 117 MG/DL (ref 0–100)
LDLC/HDLC SERPL: 1.8 {RATIO}
POTASSIUM SERPL-SCNC: 4.3 MMOL/L (ref 3.5–5.2)
PROT SERPL-MCNC: 7 G/DL (ref 6–8.5)
SODIUM SERPL-SCNC: 141 MMOL/L (ref 136–145)
TRIGL SERPL-MCNC: 85 MG/DL (ref 0–150)
VLDLC SERPL-MCNC: 15 MG/DL (ref 5–40)

## 2024-08-23 PROCEDURE — 80053 COMPREHEN METABOLIC PANEL: CPT

## 2024-08-23 PROCEDURE — 36415 COLL VENOUS BLD VENIPUNCTURE: CPT

## 2024-08-23 PROCEDURE — 80061 LIPID PANEL: CPT

## 2024-08-23 NOTE — PROGRESS NOTES
CMP is normal, lipid mildly elevated LDL at 117, age 64, no history of diabetes or hypertension recommend a low cholesterol diet repeat fasting lipid in 3 months

## 2024-10-31 ENCOUNTER — TELEPHONE (OUTPATIENT)
Dept: FAMILY MEDICINE CLINIC | Age: 64
End: 2024-10-31
Payer: COMMERCIAL

## 2024-10-31 NOTE — TELEPHONE ENCOUNTER
Pt was seen by Dr Krause post op today in office, during surgery pt had a BBB post op and returned to normal sinus rhythm. Denied any chest pain and labs wnl. Dr Krause wanted pt seen today, no availability noted. Appt made for 11/1/24 @ 9:15am and office inf.

## 2024-11-01 ENCOUNTER — OFFICE VISIT (OUTPATIENT)
Dept: FAMILY MEDICINE CLINIC | Age: 64
End: 2024-11-01
Payer: COMMERCIAL

## 2024-11-01 ENCOUNTER — LAB (OUTPATIENT)
Dept: LAB | Facility: HOSPITAL | Age: 64
End: 2024-11-01
Payer: COMMERCIAL

## 2024-11-01 VITALS
HEART RATE: 62 BPM | HEIGHT: 64 IN | TEMPERATURE: 98.1 F | WEIGHT: 138.8 LBS | OXYGEN SATURATION: 99 % | SYSTOLIC BLOOD PRESSURE: 149 MMHG | BODY MASS INDEX: 23.7 KG/M2 | DIASTOLIC BLOOD PRESSURE: 87 MMHG

## 2024-11-01 DIAGNOSIS — R00.9 HEART BEAT ABNORMALITY: ICD-10-CM

## 2024-11-01 DIAGNOSIS — R00.9 HEART BEAT ABNORMALITY: Primary | ICD-10-CM

## 2024-11-01 DIAGNOSIS — E78.00 ELEVATED CHOLESTEROL: ICD-10-CM

## 2024-11-01 LAB
ALBUMIN SERPL-MCNC: 4.4 G/DL (ref 3.5–5.2)
ALBUMIN/GLOB SERPL: 2.1 G/DL
ALP SERPL-CCNC: 67 U/L (ref 39–117)
ALT SERPL W P-5'-P-CCNC: 18 U/L (ref 1–33)
ANION GAP SERPL CALCULATED.3IONS-SCNC: 7 MMOL/L (ref 5–15)
AST SERPL-CCNC: 19 U/L (ref 1–32)
BASOPHILS # BLD AUTO: 0.03 10*3/MM3 (ref 0–0.2)
BASOPHILS NFR BLD AUTO: 0.5 % (ref 0–1.5)
BILIRUB SERPL-MCNC: 0.3 MG/DL (ref 0–1.2)
BUN SERPL-MCNC: 14 MG/DL (ref 8–23)
BUN/CREAT SERPL: 17.1 (ref 7–25)
CALCIUM SPEC-SCNC: 9.3 MG/DL (ref 8.6–10.5)
CHLORIDE SERPL-SCNC: 105 MMOL/L (ref 98–107)
CHOLEST SERPL-MCNC: 218 MG/DL (ref 0–200)
CO2 SERPL-SCNC: 29 MMOL/L (ref 22–29)
CREAT SERPL-MCNC: 0.82 MG/DL (ref 0.57–1)
DEPRECATED RDW RBC AUTO: 42.9 FL (ref 37–54)
EGFRCR SERPLBLD CKD-EPI 2021: 81 ML/MIN/1.73
EOSINOPHIL # BLD AUTO: 0.06 10*3/MM3 (ref 0–0.4)
EOSINOPHIL NFR BLD AUTO: 1 % (ref 0.3–6.2)
ERYTHROCYTE [DISTWIDTH] IN BLOOD BY AUTOMATED COUNT: 12.4 % (ref 12.3–15.4)
GLOBULIN UR ELPH-MCNC: 2.1 GM/DL
GLUCOSE SERPL-MCNC: 78 MG/DL (ref 65–99)
HCT VFR BLD AUTO: 36.4 % (ref 34–46.6)
HDLC SERPL-MCNC: 71 MG/DL (ref 40–60)
HGB BLD-MCNC: 12.2 G/DL (ref 12–15.9)
IMM GRANULOCYTES # BLD AUTO: 0 10*3/MM3 (ref 0–0.05)
IMM GRANULOCYTES NFR BLD AUTO: 0 % (ref 0–0.5)
LDLC SERPL CALC-MCNC: 129 MG/DL (ref 0–100)
LDLC/HDLC SERPL: 1.79 {RATIO}
LYMPHOCYTES # BLD AUTO: 2.24 10*3/MM3 (ref 0.7–3.1)
LYMPHOCYTES NFR BLD AUTO: 35.8 % (ref 19.6–45.3)
MAGNESIUM SERPL-MCNC: 2.2 MG/DL (ref 1.6–2.4)
MCH RBC QN AUTO: 31 PG (ref 26.6–33)
MCHC RBC AUTO-ENTMCNC: 33.5 G/DL (ref 31.5–35.7)
MCV RBC AUTO: 92.6 FL (ref 79–97)
MONOCYTES # BLD AUTO: 0.41 10*3/MM3 (ref 0.1–0.9)
MONOCYTES NFR BLD AUTO: 6.6 % (ref 5–12)
NEUTROPHILS NFR BLD AUTO: 3.51 10*3/MM3 (ref 1.7–7)
NEUTROPHILS NFR BLD AUTO: 56.1 % (ref 42.7–76)
PLATELET # BLD AUTO: 190 10*3/MM3 (ref 140–450)
PMV BLD AUTO: 9.7 FL (ref 6–12)
POTASSIUM SERPL-SCNC: 4.2 MMOL/L (ref 3.5–5.2)
PROT SERPL-MCNC: 6.5 G/DL (ref 6–8.5)
RBC # BLD AUTO: 3.93 10*6/MM3 (ref 3.77–5.28)
SODIUM SERPL-SCNC: 141 MMOL/L (ref 136–145)
T4 FREE SERPL-MCNC: 1.51 NG/DL (ref 0.92–1.68)
TRIGL SERPL-MCNC: 101 MG/DL (ref 0–150)
TSH SERPL DL<=0.05 MIU/L-ACNC: 4.22 UIU/ML (ref 0.27–4.2)
VLDLC SERPL-MCNC: 18 MG/DL (ref 5–40)
WBC NRBC COR # BLD AUTO: 6.25 10*3/MM3 (ref 3.4–10.8)

## 2024-11-01 PROCEDURE — 36415 COLL VENOUS BLD VENIPUNCTURE: CPT

## 2024-11-01 PROCEDURE — 80050 GENERAL HEALTH PANEL: CPT

## 2024-11-01 PROCEDURE — 83735 ASSAY OF MAGNESIUM: CPT

## 2024-11-01 PROCEDURE — 99214 OFFICE O/P EST MOD 30 MIN: CPT | Performed by: NURSE PRACTITIONER

## 2024-11-01 PROCEDURE — 80061 LIPID PANEL: CPT

## 2024-11-01 PROCEDURE — 84439 ASSAY OF FREE THYROXINE: CPT

## 2024-11-01 NOTE — PROGRESS NOTES
"Chief Complaint  Post-op (Ptosis 10/30/24)    Subjective        Heather Swanson presents to [facility] today for follow-up from recent proptosis surgery 10/30/2024, during the surgery anesthesiologist noted for her to have a bundle branch block which was new, never had a history of this before.  Denies chest pain shortness of breath or other symptoms.  Was told to follow-up primary care provider and have further testing.  CMP done yesterday was normal, will check magnesium, thyroid level and blood count today.      Current Outpatient Medications:     calcium carbonate (TUMS) 500 MG chewable tablet, Chew 1 tablet Daily., Disp: , Rfl:     cholecalciferol (VITAMIN D3) 25 MCG (1000 UT) tablet, Take 1 tablet by mouth Daily., Disp: , Rfl:     multivitamin with minerals tablet tablet, Take 1 tablet by mouth Daily., Disp: , Rfl:     pantoprazole (PROTONIX) 20 MG EC tablet, Take 1 tablet by mouth Daily., Disp: 90 tablet, Rfl: 3    rizatriptan MLT (MAXALT-MLT) 10 MG disintegrating tablet, Place 1 tablet on the tongue 1 (One) Time As Needed for Migraine for up to 60 doses. May repeat in 2 hours if needed, Disp: 30 tablet, Rfl: 1    valACYclovir (VALTREX) 500 MG tablet, Take 1 tablet by mouth Daily., Disp: , Rfl:   Medications Discontinued During This Encounter   Medication Reason    vitamin E 400 UNIT capsule *Therapy completed         Allergies:  Diclofenac, Diflucan [fluconazole], and Morphine      Objective   Vital Signs:   Vitals:    11/01/24 0931   BP: 149/87   BP Location: Right arm   Patient Position: Sitting   Cuff Size: Adult   Pulse: 62   Temp: 98.1 °F (36.7 °C)   TempSrc: Oral   SpO2: 99%   Weight: 63 kg (138 lb 12.8 oz)   Height: 162.6 cm (64\")     Body mass index is 23.82 kg/m².  BMI is within normal parameters. No other follow-up for BMI required.        Physical Exam  Constitutional:       Appearance: Normal appearance.   Neck:      Vascular: No carotid bruit.   Cardiovascular: "      Rate and Rhythm: Normal rate and regular rhythm.      Heart sounds: Normal heart sounds.   Pulmonary:      Effort: Pulmonary effort is normal.      Breath sounds: Normal breath sounds.   Musculoskeletal:         General: Normal range of motion.   Skin:     General: Skin is warm and dry.   Neurological:      General: No focal deficit present.      Mental Status: She is alert.   Psychiatric:         Mood and Affect: Mood normal.         Behavior: Behavior normal.         Answers submitted by the patient for this visit:  Other (Submitted on 11/1/2024)  Please describe your symptoms.: Follow up request from Dr. Krause from my eye lid surgery. Heart rate and ecg was irregular after surgery. ( Left transient bundle block). , Had to administer metaprol to get heart beat regularl and then morning after, I was weak and almost faint after going diwn my basement stairs and back up.  Have you had these symptoms before?: No  How long have you been having these symptoms?: 1-4 days  Primary Reason for Visit (Submitted on 11/1/2024)  What is the primary reason for your visit?: Problem Not Listed      Lab Results   Component Value Date    GLUCOSE 91 08/23/2024    BUN 11 08/23/2024    CREATININE 0.83 08/23/2024    BCR 13.3 08/23/2024    K 4.3 08/23/2024    CO2 29.0 08/23/2024    CALCIUM 9.4 08/23/2024    ALBUMIN 4.4 08/23/2024    AST 20 08/23/2024    ALT 13 08/23/2024       Lab Results   Component Value Date    CHOL 196 08/23/2024    TRIG 85 08/23/2024    HDL 64 (H) 08/23/2024     (H) 08/23/2024       Lab Results   Component Value Date    WBC 4.68 07/22/2024    HGB 13.8 07/22/2024    HCT 41.0 07/22/2024    MCV 90.9 07/22/2024     07/22/2024           Procedures         Diagnoses and all orders for this visit:    1. Heart beat abnormality (Primary)  -     Comprehensive Metabolic Panel; Future  -     CBC & Differential; Future  -     Magnesium; Future  -     TSH Rfx On Abnormal To Free T4; Future  -     Ambulatory  Referral to Cardiology  -     Holter Monitor - 48 Hour; Future            Follow Up  Return in about 3 months (around 2/1/2025), or if symptoms worsen or fail to improve, for Annual physical, will call with lab results.  Patient was given instructions and counseling regarding her condition or for health maintenance advice. Please see specific information pulled into the AVS if appropriate.           Jeannine Novoa, APRN  11/01/2024    Please note that portions of this document were completed using a voice recognition program.

## 2024-11-04 DIAGNOSIS — E03.9 HYPOTHYROIDISM, UNSPECIFIED TYPE: ICD-10-CM

## 2024-11-04 DIAGNOSIS — R79.89 ABNORMAL BUN-TO-CREATININE RATIO: Primary | ICD-10-CM

## 2024-11-14 ENCOUNTER — OFFICE VISIT (OUTPATIENT)
Dept: CARDIOLOGY | Facility: CLINIC | Age: 64
End: 2024-11-14
Payer: COMMERCIAL

## 2024-11-14 VITALS
BODY MASS INDEX: 23.22 KG/M2 | HEART RATE: 76 BPM | WEIGHT: 136 LBS | DIASTOLIC BLOOD PRESSURE: 88 MMHG | SYSTOLIC BLOOD PRESSURE: 150 MMHG | HEIGHT: 64 IN

## 2024-11-14 DIAGNOSIS — R06.02 EXERTIONAL SHORTNESS OF BREATH: Primary | ICD-10-CM

## 2024-11-14 DIAGNOSIS — I44.7 LBBB (LEFT BUNDLE BRANCH BLOCK): ICD-10-CM

## 2024-11-14 PROCEDURE — 99204 OFFICE O/P NEW MOD 45 MIN: CPT | Performed by: INTERNAL MEDICINE

## 2024-11-14 NOTE — ASSESSMENT & PLAN NOTE
She was noted to have a transient left bundle branch block in the postoperative period following proptosis surgery, which was done under general anesthesia.  Subsequent EKG showed sinus rhythm.  A Holter monitor study which was done after discharge also showed no bundle branch block.  Isolated PVCs were noted.  Ischemia needs to be ruled out, hence we are proceeding with echocardiogram and stress test.

## 2024-11-14 NOTE — PROGRESS NOTES
CARDIOLOGY INITIAL CONSULT       Chief Complaint  Establish Care, Irregular Heart Beat, and Rapid Heart Rate    Subjective            Heather Swanson presents to [facility]  History of Present Illness    This is a 64-year-old female with no major medical problems.  She underwent surgery for proptosis on 10/30/2024.  Patient was told that, she developed bundle branch block during the recovery.  Per patient, she received IV beta-blockers.  It is unclear whether the heart rate was high or not.  She was advised to follow-up with cardiology on discharge.    For the past 2 weeks, patient is experiencing multiple symptoms.  She gets short of breath with moderate exertion.  She also feels some palpitations.  Her heart rate goes to 120 or high even with minimal exertion.  She has no chest pain.  Of note, before the surgery, she was able to do exercise on a treadmill without any problems.  She stopped doing exercise after the surgery.  She had a Holter monitor study done recently.      Past History:    Medical History:  Past Medical History:   Diagnosis Date    Acid reflux     Migraines        Family History: family history includes Cancer in her brother, father, and sister; Osteoporosis in her mother and sister.     Social History: reports that she has never smoked. She has never used smokeless tobacco. She reports that she does not currently use alcohol after a past usage of about 1.0 standard drink of alcohol per week. She reports that she does not use drugs.    Allergies: Diclofenac, Diflucan [fluconazole], and Morphine    Current Outpatient Medications on File Prior to Visit   Medication Sig    calcium carbonate (TUMS) 500 MG chewable tablet Chew 1 tablet Daily.    cholecalciferol (VITAMIN D3) 25 MCG (1000 UT) tablet Take 1 tablet by mouth Daily.    multivitamin with minerals tablet tablet Take 1 tablet by mouth Daily.    pantoprazole (PROTONIX) 20 MG EC tablet Take 1 tablet by mouth Daily.  "   rizatriptan MLT (MAXALT-MLT) 10 MG disintegrating tablet Place 1 tablet on the tongue 1 (One) Time As Needed for Migraine for up to 60 doses. May repeat in 2 hours if needed    valACYclovir (VALTREX) 500 MG tablet Take 1 tablet by mouth Daily.     No current facility-administered medications on file prior to visit.          Review of Systems   Constitutional:  Negative for fatigue, unexpected weight gain and unexpected weight loss.   Eyes:  Negative for double vision.   Respiratory:  Positive for shortness of breath. Negative for cough and wheezing.    Cardiovascular:  Positive for palpitations. Negative for chest pain and leg swelling.   Gastrointestinal:  Negative for abdominal pain, nausea and vomiting.   Endocrine: Negative for cold intolerance, heat intolerance, polydipsia and polyuria.   Musculoskeletal:  Negative for arthralgias and back pain.   Skin:  Negative for color change.   Neurological:  Negative for dizziness, syncope, weakness and headache.   Hematological:  Does not bruise/bleed easily.        Objective     /88   Pulse 76   Ht 162.6 cm (64\")   Wt 61.7 kg (136 lb)   BMI 23.34 kg/m²       Physical Exam  Constitutional:       General: She is awake. She is not in acute distress.     Appearance: Normal appearance.   Eyes:      Extraocular Movements: Extraocular movements intact.      Pupils: Pupils are equal, round, and reactive to light.   Neck:      Thyroid: No thyromegaly.      Vascular: No carotid bruit or JVD.   Cardiovascular:      Rate and Rhythm: Normal rate and regular rhythm.      Chest Wall: PMI is not displaced.      Heart sounds: Normal heart sounds, S1 normal and S2 normal. No murmur heard.     No friction rub. No gallop. No S3 or S4 sounds.   Pulmonary:      Effort: Pulmonary effort is normal. No respiratory distress.      Breath sounds: Normal breath sounds. No wheezing, rhonchi or rales.   Abdominal:      General: Bowel sounds are normal.      Palpations: Abdomen is soft. "      Tenderness: There is no abdominal tenderness.   Musculoskeletal:      Cervical back: Neck supple.      Right lower leg: No edema.      Left lower leg: No edema.   Skin:     Nails: There is no clubbing.   Neurological:      General: No focal deficit present.      Mental Status: She is alert and oriented to person, place, and time.           Result Review :     The following data was reviewed by: Santhosh Cash MD on 11/14/2024:    CMP          7/22/2024    07:06 8/23/2024    07:09 11/1/2024    10:38   CMP   Glucose 93  91  78    BUN 18  11  14    Creatinine 0.88  0.83  0.82    EGFR 74.4  79.8  81.0    Sodium 143  141  141    Potassium 3.9  4.3  4.2    Chloride 103  103  105    Calcium 9.5  9.4  9.3    Total Protein 7.1  7.0  6.5    Albumin 4.6  4.4  4.4    Globulin 2.5  2.6  2.1    Total Bilirubin 0.4  0.5  0.3    Alkaline Phosphatase 72  70  67    AST (SGOT) 20  20  19    ALT (SGPT) 16  13  18    Albumin/Globulin Ratio 1.8  1.7  2.1    BUN/Creatinine Ratio 20.5  13.3  17.1    Anion Gap 12.7  9.0  7.0      CBC          7/22/2024    07:06 11/1/2024    10:38   CBC   WBC 4.68  6.25    RBC 4.51  3.93    Hemoglobin 13.8  12.2    Hematocrit 41.0  36.4    MCV 90.9  92.6    MCH 30.6  31.0    MCHC 33.7  33.5    RDW 12.2  12.4    Platelets 220  190      TSH          7/22/2024    07:06 11/1/2024    10:38   TSH   TSH 2.680  4.220      Lipid Panel          7/22/2024    07:06 8/23/2024    07:09 11/1/2024    10:38   Lipid Panel   Total Cholesterol 226  196  218    Triglycerides 114  85  101    HDL Cholesterol 69  64  71    VLDL Cholesterol 20  15  18    LDL Cholesterol  137  117  129    LDL/HDL Ratio 1.94  1.80  1.79         Data reviewed: Cardiology studies              48-hour Holter monitor study done on 11/1/2024 showed      A relatively benign 48-hour Holter monitor study.    Underlying rhythm is sinus rhythm with an average heart rate of 76 bpm.    Occasional (203 in total) atrial premature complexes noted.  There  were 3 short runs of supraventricular ectopy.  The longest run was of 2 seconds duration.    There were rare (21 in total) isolated PVCs.  There were no arrhythmias or long pauses.               Assessment and Plan        Diagnoses and all orders for this visit:    1. Exertional shortness of breath (Primary)  Assessment & Plan:  She is currently reporting exertional shortness of breath and fatigue.  One of the EKGs from recent surgery shows left bundle branch block.  She has no chest pain.  Due to abnormal EKG and no symptoms, she will need further cardiac evaluation.  We will proceed with an echocardiogram to assess LV function and valvular function.  SPECT stress test will be done to rule out reversible ischemia.    Orders:  -     Adult Transthoracic Echo Complete W/ Cont if Necessary Per Protocol; Future  -     Stress Test With Myocardial Perfusion One Day; Future    2. LBBB (left bundle branch block)  Assessment & Plan:  She was noted to have a transient left bundle branch block in the postoperative period following proptosis surgery, which was done under general anesthesia.  Subsequent EKG showed sinus rhythm.  A Holter monitor study which was done after discharge also showed no bundle branch block.  Isolated PVCs were noted.  Ischemia needs to be ruled out, hence we are proceeding with echocardiogram and stress test.       Other orders  -     ECG Scan  -     ECG Scan          I spent 25 minutes caring for Heather on this date of service. This time includes time spent by me in the following activities:reviewing tests, obtaining and/or reviewing a separately obtained history, performing a medically appropriate examination and/or evaluation , ordering medications, tests, or procedures, and documenting information in the medical record    Follow Up     Return for Next scheduled follow up.    Patient was given instructions and counseling regarding her condition or for health maintenance advice. Please see specific  information pulled into the AVS if appropriate.

## 2024-11-14 NOTE — ASSESSMENT & PLAN NOTE
She is currently reporting exertional shortness of breath and fatigue.  One of the EKGs from recent surgery shows left bundle branch block.  She has no chest pain.  Due to abnormal EKG and no symptoms, she will need further cardiac evaluation.  We will proceed with an echocardiogram to assess LV function and valvular function.  SPECT stress test will be done to rule out reversible ischemia.

## 2024-11-27 ENCOUNTER — HOSPITAL ENCOUNTER (OUTPATIENT)
Dept: NUCLEAR MEDICINE | Facility: HOSPITAL | Age: 64
Discharge: HOME | End: 2024-11-27
Payer: COMMERCIAL

## 2024-11-27 ENCOUNTER — HOSPITAL ENCOUNTER (OUTPATIENT)
Dept: CARDIOLOGY | Facility: HOSPITAL | Age: 64
Discharge: HOME | End: 2024-11-27
Admitting: INTERNAL MEDICINE
Payer: COMMERCIAL

## 2024-11-27 DIAGNOSIS — R06.02 EXERTIONAL SHORTNESS OF BREATH: ICD-10-CM

## 2024-11-27 LAB
AORTIC DIMENSIONLESS INDEX: 0.83 (DI)
ASCENDING AORTA: 2.7 CM
BH CV ECHO MEAS - ACS: 1.6 CM
BH CV ECHO MEAS - AO MAX PG: 7.8 MMHG
BH CV ECHO MEAS - AO MEAN PG: 4 MMHG
BH CV ECHO MEAS - AO ROOT DIAM: 2.7 CM
BH CV ECHO MEAS - AO V2 MAX: 140 CM/SEC
BH CV ECHO MEAS - AO V2 VTI: 29.7 CM
BH CV ECHO MEAS - AVA(I,D): 2.6 CM2
BH CV ECHO MEAS - EDV(CUBED): 64 ML
BH CV ECHO MEAS - EDV(MOD-SP2): 77.9 ML
BH CV ECHO MEAS - EDV(MOD-SP4): 73.3 ML
BH CV ECHO MEAS - EF(MOD-BP): 64.9 %
BH CV ECHO MEAS - EF(MOD-SP2): 65.9 %
BH CV ECHO MEAS - EF(MOD-SP4): 64.8 %
BH CV ECHO MEAS - ESV(CUBED): 13.8 ML
BH CV ECHO MEAS - ESV(MOD-SP2): 26.6 ML
BH CV ECHO MEAS - ESV(MOD-SP4): 25.8 ML
BH CV ECHO MEAS - FS: 40 %
BH CV ECHO MEAS - IVS/LVPW: 1 CM
BH CV ECHO MEAS - IVSD: 0.7 CM
BH CV ECHO MEAS - LA DIMENSION: 3.3 CM
BH CV ECHO MEAS - LAT PEAK E' VEL: 14.9 CM/SEC
BH CV ECHO MEAS - LV MASS(C)D: 78.4 GRAMS
BH CV ECHO MEAS - LV MAX PG: 6 MMHG
BH CV ECHO MEAS - LV MEAN PG: 3 MMHG
BH CV ECHO MEAS - LV V1 MAX: 122 CM/SEC
BH CV ECHO MEAS - LV V1 VTI: 24.7 CM
BH CV ECHO MEAS - LVIDD: 4 CM
BH CV ECHO MEAS - LVIDS: 2.4 CM
BH CV ECHO MEAS - LVOT AREA: 3.1 CM2
BH CV ECHO MEAS - LVOT DIAM: 2 CM
BH CV ECHO MEAS - LVPWD: 0.7 CM
BH CV ECHO MEAS - MED PEAK E' VEL: 7.5 CM/SEC
BH CV ECHO MEAS - MR MAX PG: 153.5 MMHG
BH CV ECHO MEAS - MR MAX VEL: 618.5 CM/SEC
BH CV ECHO MEAS - MR MEAN PG: 98 MMHG
BH CV ECHO MEAS - MR MEAN VEL: 450 CM/SEC
BH CV ECHO MEAS - MR VTI: 211 CM
BH CV ECHO MEAS - MV A MAX VEL: 73.4 CM/SEC
BH CV ECHO MEAS - MV DEC SLOPE: 614 CM/SEC2
BH CV ECHO MEAS - MV DEC TIME: 0.17 SEC
BH CV ECHO MEAS - MV E MAX VEL: 99.6 CM/SEC
BH CV ECHO MEAS - MV E/A: 1.36
BH CV ECHO MEAS - MV MEAN PG: 2 MMHG
BH CV ECHO MEAS - MV P1/2T: 53.9 MSEC
BH CV ECHO MEAS - MV V2 VTI: 29.7 CM
BH CV ECHO MEAS - MVA(P1/2T): 4.1 CM2
BH CV ECHO MEAS - MVA(VTI): 2.6 CM2
BH CV ECHO MEAS - PULM A REVS DUR: 0.12 SEC
BH CV ECHO MEAS - PULM A REVS VEL: 32.7 CM/SEC
BH CV ECHO MEAS - PULM DIAS VEL: 39.7 CM/SEC
BH CV ECHO MEAS - PULM S/D: 1.19
BH CV ECHO MEAS - PULM SYS VEL: 47.2 CM/SEC
BH CV ECHO MEAS - RAP SYSTOLE: 5 MMHG
BH CV ECHO MEAS - RVDD: 3.4 CM
BH CV ECHO MEAS - RVSP: 34.4 MMHG
BH CV ECHO MEAS - SV(LVOT): 77.6 ML
BH CV ECHO MEAS - SV(MOD-SP2): 51.3 ML
BH CV ECHO MEAS - SV(MOD-SP4): 47.5 ML
BH CV ECHO MEAS - TAPSE (>1.6): 1.68 CM
BH CV ECHO MEAS - TR MAX PG: 29.4 MMHG
BH CV ECHO MEAS - TR MAX VEL: 271 CM/SEC
BH CV ECHO MEASUREMENTS AVERAGE E/E' RATIO: 8.89
BH CV IMMEDIATE POST RECOVERY TECH DATA SYMPTOMS: NORMAL
BH CV IMMEDIATE POST TECH DATA BLOOD PRESSURE: NORMAL MMHG
BH CV IMMEDIATE POST TECH DATA HEART RATE: 110 BPM
BH CV IMMEDIATE POST TECH DATA OXYGEN SATS: 98 %
BH CV REST NUCLEAR ISOTOPE DOSE: 9 MCI
BH CV SIX MINUTE RECOVERY TECH DATA BLOOD PRESSURE: NORMAL
BH CV SIX MINUTE RECOVERY TECH DATA HEART RATE: 98 BPM
BH CV SIX MINUTE RECOVERY TECH DATA OXYGEN SATURATION: 98 %
BH CV SIX MINUTE RECOVERY TECH DATA SYMPTOMS: NORMAL
BH CV STRESS BP STAGE 1: NORMAL
BH CV STRESS BP STAGE 2: NORMAL
BH CV STRESS COMMENTS STAGE 1: NORMAL
BH CV STRESS COMMENTS STAGE 2: NORMAL
BH CV STRESS DOSE REGADENOSON STAGE 1: 0.4
BH CV STRESS DURATION MIN STAGE 1: 2
BH CV STRESS DURATION MIN STAGE 2: 2
BH CV STRESS DURATION SEC STAGE 1: 0
BH CV STRESS DURATION SEC STAGE 2: 0
BH CV STRESS HR STAGE 1: 98
BH CV STRESS HR STAGE 2: 115
BH CV STRESS NUCLEAR ISOTOPE DOSE: 34.7 MCI
BH CV STRESS O2 STAGE 1: 98
BH CV STRESS O2 STAGE 2: 98
BH CV STRESS PROTOCOL 1: NORMAL
BH CV STRESS RECOVERY BP: NORMAL MMHG
BH CV STRESS RECOVERY HR: 102 BPM
BH CV STRESS RECOVERY O2: 98 %
BH CV STRESS STAGE 1: 1
BH CV STRESS STAGE 2: 2
BH CV THREE MINUTE POST TECH DATA BLOOD PRESSURE: NORMAL MMHG
BH CV THREE MINUTE POST TECH DATA HEART RATE: 98 BPM
BH CV THREE MINUTE POST TECH DATA OXYGEN SATURATION: 98 %
BH CV XLRA - TDI S': 19.6 CM/SEC
IVRT: 71 MS
LEFT ATRIUM VOLUME INDEX: 17.8 ML/M2
LV EF NUC BP: 66 %
MAXIMAL PREDICTED HEART RATE: 158 BPM
PERCENT MAX PREDICTED HR: 72.78 %
STRESS BASELINE BP: NORMAL MMHG
STRESS BASELINE HR: 63 BPM
STRESS O2 SAT REST: 98 %
STRESS PERCENT HR: 86 %
STRESS POST O2 SAT PEAK: 98 %
STRESS POST PEAK BP: NORMAL MMHG
STRESS POST PEAK HR: 115 BPM
STRESS TARGET HR: 134 BPM

## 2024-11-27 PROCEDURE — 93017 CV STRESS TEST TRACING ONLY: CPT

## 2024-11-27 PROCEDURE — 34310000005 TECHNETIUM SESTAMIBI: Performed by: INTERNAL MEDICINE

## 2024-11-27 PROCEDURE — 93306 TTE W/DOPPLER COMPLETE: CPT

## 2024-11-27 PROCEDURE — 78452 HT MUSCLE IMAGE SPECT MULT: CPT

## 2024-11-27 PROCEDURE — A9500 TC99M SESTAMIBI: HCPCS | Performed by: INTERNAL MEDICINE

## 2024-11-27 PROCEDURE — 25010000002 REGADENOSON 0.4 MG/5ML SOLUTION: Performed by: INTERNAL MEDICINE

## 2024-11-27 RX ORDER — REGADENOSON 0.08 MG/ML
0.4 INJECTION, SOLUTION INTRAVENOUS
Status: COMPLETED | OUTPATIENT
Start: 2024-11-27 | End: 2024-11-27

## 2024-11-27 RX ADMIN — TECHNETIUM TC 99M SESTAMIBI 1 DOSE: 1 INJECTION INTRAVENOUS at 10:30

## 2024-11-27 RX ADMIN — REGADENOSON 0.4 MG: 0.08 INJECTION, SOLUTION INTRAVENOUS at 10:30

## 2024-11-27 RX ADMIN — TECHNETIUM TC 99M SESTAMIBI 1 DOSE: 1 INJECTION INTRAVENOUS at 09:00

## 2024-11-27 NOTE — PROGRESS NOTES
Echocardiogram showed normal heart function.  There are no major valvular problems.  Mild regurgitation/EKG of mitral valve noted.    Stress test showed normal blood supply to all the walls of the heart, indicating that there are no major blockages of the heart arteries.    Incidentally, EKGs at the time of stress test showed left bundle branch block.  It was noted during recent eye surgery as well.  Intermittent and transient left bundle branch block does not require any specific treatment, especially since the stress test and echocardiogram are within normal limits.    Okay to resume exercise at a slower pace now.  We will do a follow-up in next 6 to 8 weeks for reevaluation.  (Okay to use new patient's slot if needed).      Electronically signed by Santhosh Cash MD, 11/27/24, 4:10 PM EST.

## 2024-12-02 ENCOUNTER — TELEPHONE (OUTPATIENT)
Dept: CARDIOLOGY | Facility: CLINIC | Age: 64
End: 2024-12-02
Payer: COMMERCIAL

## 2024-12-02 DIAGNOSIS — R42 DIZZINESS: Primary | ICD-10-CM

## 2024-12-02 NOTE — TELEPHONE ENCOUNTER
----- Message from Santhosh Cash sent at 11/27/2024  4:10 PM EST -----  Echocardiogram showed normal heart function.  There are no major valvular problems.  Mild regurgitation/EKG of mitral valve noted.    Stress test showed normal blood supply to all the walls of the heart, indicating that there are no major blockages of the heart arteries.    Incidentally, EKGs at the time of stress test showed left bundle branch block.  It was noted during recent eye surgery as well.  Intermittent and transient left bundle branch block does not require any specific treatment, especially since the stress test and echocardiogram are within normal limits.    Okay to resume exercise at a slower pace now.  We will do a follow-up in next 6 to 8 weeks for reevaluation.  (Okay to use new patient's slot if needed).      Electronically signed by Santhosh Cash MD, 11/27/24, 4:10 PM EST.

## 2024-12-02 NOTE — TELEPHONE ENCOUNTER
YANETH patient. Went over results and recommendations. Patient stated understanding and voiced a concern that occurred over the weekend. Patient states Friday morning she just woke up, walked to the bathroom and became light headed and felt faint her she checked her HR it was 118. A second incident occurred yesterday morning, she was making pancakes and felt faint, her heart rate was at 135. She sat down and it went away.     Please advise on f/u

## 2024-12-02 NOTE — TELEPHONE ENCOUNTER
YANETH patient. Went over recommendations. Patient verbalized understanding and agreed with recommendation. Patient ask that when they schedule the appointment they can schedule it for [facility] if possible.

## 2024-12-09 ENCOUNTER — LAB (OUTPATIENT)
Dept: LAB | Facility: HOSPITAL | Age: 64
End: 2024-12-09
Payer: COMMERCIAL

## 2024-12-09 DIAGNOSIS — R79.89 ABNORMAL BUN-TO-CREATININE RATIO: ICD-10-CM

## 2024-12-09 DIAGNOSIS — E03.9 HYPOTHYROIDISM, UNSPECIFIED TYPE: ICD-10-CM

## 2024-12-09 LAB
ALBUMIN SERPL-MCNC: 4.4 G/DL (ref 3.5–5.2)
ALBUMIN/GLOB SERPL: 1.8 G/DL
ALP SERPL-CCNC: 79 U/L (ref 39–117)
ALT SERPL W P-5'-P-CCNC: 13 U/L (ref 1–33)
ANION GAP SERPL CALCULATED.3IONS-SCNC: 8.4 MMOL/L (ref 5–15)
AST SERPL-CCNC: 21 U/L (ref 1–32)
BILIRUB SERPL-MCNC: 0.3 MG/DL (ref 0–1.2)
BUN SERPL-MCNC: 17 MG/DL (ref 8–23)
BUN/CREAT SERPL: 20.5 (ref 7–25)
CALCIUM SPEC-SCNC: 9.4 MG/DL (ref 8.6–10.5)
CHLORIDE SERPL-SCNC: 105 MMOL/L (ref 98–107)
CO2 SERPL-SCNC: 28.6 MMOL/L (ref 22–29)
CREAT SERPL-MCNC: 0.83 MG/DL (ref 0.57–1)
EGFRCR SERPLBLD CKD-EPI 2021: 79.8 ML/MIN/1.73
GLOBULIN UR ELPH-MCNC: 2.5 GM/DL
GLUCOSE SERPL-MCNC: 86 MG/DL (ref 65–99)
POTASSIUM SERPL-SCNC: 4.4 MMOL/L (ref 3.5–5.2)
PROT SERPL-MCNC: 6.9 G/DL (ref 6–8.5)
SODIUM SERPL-SCNC: 142 MMOL/L (ref 136–145)
T4 FREE SERPL-MCNC: 1.14 NG/DL (ref 0.92–1.68)
TSH SERPL DL<=0.05 MIU/L-ACNC: 2.83 UIU/ML (ref 0.27–4.2)

## 2024-12-09 PROCEDURE — 84439 ASSAY OF FREE THYROXINE: CPT

## 2024-12-09 PROCEDURE — 84443 ASSAY THYROID STIM HORMONE: CPT

## 2024-12-09 PROCEDURE — 80053 COMPREHEN METABOLIC PANEL: CPT

## 2024-12-09 PROCEDURE — 36415 COLL VENOUS BLD VENIPUNCTURE: CPT

## 2024-12-09 NOTE — PROGRESS NOTES
TSH with free T4 back to normal.  Will continue to watch this with yearly labs, but no treatment is needed at this time.

## 2024-12-17 ENCOUNTER — TELEPHONE (OUTPATIENT)
Dept: CARDIOLOGY | Facility: CLINIC | Age: 64
End: 2024-12-17

## 2024-12-17 NOTE — TELEPHONE ENCOUNTER
Received a call with a critical result from Meaghan on patient. Stated on 12/13/2024 at 7:05 AM the patient had SVT's with a maximum heart rate of 187. The patient did not report any symptoms.    Patient 7 day monitor ended on 12/16/2024. Still waiting for report.

## 2024-12-17 NOTE — TELEPHONE ENCOUNTER
SW patient, patient states she was cooking and felt faint, thought she indicated. Patient states there were several times she felt something and reported.     Patient waiting patiently for full results. Patient is concerned about what rhythm was noted at that time. Feels anxious enough she has stopped being intimate with her partner until she finds out more information.

## 2024-12-18 ENCOUNTER — TELEPHONE (OUTPATIENT)
Dept: CARDIOLOGY | Facility: CLINIC | Age: 64
End: 2024-12-18
Payer: COMMERCIAL

## 2024-12-18 RX ORDER — METOPROLOL SUCCINATE 25 MG/1
25 TABLET, EXTENDED RELEASE ORAL NIGHTLY
Qty: 90 TABLET | Refills: 3 | Status: SHIPPED | OUTPATIENT
Start: 2024-12-18

## 2024-12-18 NOTE — TELEPHONE ENCOUNTER
SW patient, went over results and recommendations. Sent prescription and scheduled f/u. Patient verbalized understanding and appreciation.

## 2024-12-18 NOTE — TELEPHONE ENCOUNTER
----- Message from Klaudia Holbrook sent at 12/18/2024  8:19 AM EST -----  Holter monitor shows her underlying rhythm is normal sinus with an average heart rate of 82.  However she frequently had tachycardic rates, and she did have episode of SVT.  Would recommend she try vagal maneuvers if she is aware of symptoms with SVT, but also recommend starting Toprol 25 mg nightly.  She can be scheduled for follow-up with visit with me in [place] at the end of the month to go over her results in more detail, as well as reevaluate symptoms.

## 2024-12-31 ENCOUNTER — OFFICE VISIT (OUTPATIENT)
Dept: CARDIOLOGY | Facility: CLINIC | Age: 64
End: 2024-12-31
Payer: COMMERCIAL

## 2024-12-31 VITALS
BODY MASS INDEX: 24.07 KG/M2 | WEIGHT: 141 LBS | DIASTOLIC BLOOD PRESSURE: 80 MMHG | HEART RATE: 71 BPM | SYSTOLIC BLOOD PRESSURE: 122 MMHG | HEIGHT: 64 IN

## 2024-12-31 DIAGNOSIS — I34.0 NONRHEUMATIC MITRAL VALVE REGURGITATION: ICD-10-CM

## 2024-12-31 DIAGNOSIS — I44.7 LBBB (LEFT BUNDLE BRANCH BLOCK): Primary | ICD-10-CM

## 2024-12-31 DIAGNOSIS — R06.02 EXERTIONAL SHORTNESS OF BREATH: ICD-10-CM

## 2024-12-31 PROCEDURE — 99213 OFFICE O/P EST LOW 20 MIN: CPT | Performed by: FAMILY MEDICINE

## 2024-12-31 NOTE — ASSESSMENT & PLAN NOTE
Incidental finding postoperatively, she has no anginal symptoms, and SPECT stress test was negative for ischemia.  No further cardiac testing needed at this time.

## 2024-12-31 NOTE — ASSESSMENT & PLAN NOTE
She is no longer complaining of any shortness of breath, she feels better after starting metoprolol.  Echocardiogram showed normal LV function, with mild to moderate mitral regurgitation.  SPECT stress test was negative for reversible ischemia.

## 2024-12-31 NOTE — ASSESSMENT & PLAN NOTE
Mild to moderate mitral valve regurgitation noted on echocardiogram with normal LV function.  She has no further complaints of shortness of breath.  We will plan to repeat echocardiogram in 2 years or sooner if she develops new or concerning symptoms.

## 2024-12-31 NOTE — PROGRESS NOTES
Chief Complaint  Shortness of Breath (No symptoms since on toprol)    Subjective        History of Present Illness  Heather Swanson presents to [facility] CARDIOLOGY   Ms. Swanson is a 64-year-old female patient coming in today for cardiac follow-up.  She was initially seen and evaluated after findings of left bundle branch block on EKG following surgery.  She has some mild symptoms of shortness of breath, and Holter showed short runs of supraventricular ectopy.  She has since had echocardiogram showing normal LV function with mild mitral valve regurgitation, and SPECT stress test was negative for ischemia.  She is currently taking low-dose beta-blocker medication, and is now symptom-free without any complaints of shortness of breath, chest pains, palpitations lightheadedness or dizziness.    Past Medical History:   Diagnosis Date    Acid reflux     Migraines        Allergies   Allergen Reactions    Diclofenac Hives    Diflucan [Fluconazole] Unknown - High Severity    Morphine Headache        Past Surgical History:   Procedure Laterality Date    BLADDER REPAIR      COLONOSCOPY      COLONOSCOPY N/A 09/26/2022    Procedure: COLONOSCOPY WITH POLYPECTOMY, CAUTERY;  Surgeon: Krystin Solorzano MD;  Location: [facility] ENDOSCOPY;  Service: Gastroenterology;  Laterality: N/A;  COLON POLYP    ENDOSCOPY      ENDOSCOPY N/A 09/26/2022    Procedure: ESOPHAGOGASTRODUODENOSCOPY WITH BIOPSIES;  Surgeon: Krystin Solorzano MD;  Location: [facility] ENDOSCOPY;  Service: Gastroenterology;  Laterality: N/A;  HIATAL HERNIA    FOOT SURGERY Left     HERNIA REPAIR      HYSTERECTOMY Right     WRIST SURGERY Left         Social History  She  reports that she has never smoked. She has never used smokeless tobacco. She reports that she does not currently use alcohol after a past usage of about 1.0 standard drink of alcohol per week. She reports that she does not use drugs.    Family History  Her family history includes  "Cancer in her brother, father, and sister; Osteoporosis in her mother and sister.       Current Outpatient Medications on File Prior to Visit   Medication Sig    calcium carbonate (TUMS) 500 MG chewable tablet Chew 1 tablet Daily.    cholecalciferol (VITAMIN D3) 25 MCG (1000 UT) tablet Take 1 tablet by mouth Daily.    metoprolol succinate XL (TOPROL-XL) 25 MG 24 hr tablet Take 1 tablet by mouth Every Night.    multivitamin with minerals tablet tablet Take 1 tablet by mouth Daily.    pantoprazole (PROTONIX) 20 MG EC tablet Take 1 tablet by mouth Daily.    rizatriptan MLT (MAXALT-MLT) 10 MG disintegrating tablet Place 1 tablet on the tongue 1 (One) Time As Needed for Migraine for up to 60 doses. May repeat in 2 hours if needed    valACYclovir (VALTREX) 500 MG tablet Take 1 tablet by mouth Daily.     No current facility-administered medications on file prior to visit.         Review of Systems   Constitutional:  Negative for fatigue.   Respiratory:  Negative for cough, chest tightness and shortness of breath.    Cardiovascular:  Negative for chest pain, palpitations and leg swelling.   Gastrointestinal:  Negative for nausea and vomiting.   Neurological:  Negative for dizziness and syncope.        Objective   Vitals:    12/31/24 0851   BP: 122/80   Pulse: 71   Weight: 64 kg (141 lb)   Height: 162.6 cm (64\")         Physical Exam  General : Alert, awake, no acute distress  Neck : Supple, no carotid bruit, no jugular venous distention  CVS : Regular rate and rhythm, no murmur, no rubs or gallops  Lungs: Clear to auscultation bilaterally, no crackles or rhonchi  Abdomen: Soft, nontender, bowel sounds active  Extremities: Warm, well-perfused, no pedal edema      Result Review     The following data was reviewed by Klaudia Gonzalez, APRN  No results found for: \"PROBNP\"  CMP          8/23/2024    07:09 11/1/2024    10:38 12/9/2024    07:31   CMP   Glucose 91  78  86    BUN 11  14  17    Creatinine 0.83  0.82  0.83    EGFR 79.8  " "81.0  79.8    Sodium 141  141  142    Potassium 4.3  4.2  4.4    Chloride 103  105  105    Calcium 9.4  9.3  9.4    Total Protein 7.0  6.5  6.9    Albumin 4.4  4.4  4.4    Globulin 2.6  2.1  2.5    Total Bilirubin 0.5  0.3  0.3    Alkaline Phosphatase 70  67  79    AST (SGOT) 20  19  21    ALT (SGPT) 13  18  13    Albumin/Globulin Ratio 1.7  2.1  1.8    BUN/Creatinine Ratio 13.3  17.1  20.5    Anion Gap 9.0  7.0  8.4      CBC w/diff          7/22/2024    07:06 11/1/2024    10:38   CBC w/Diff   WBC 4.68  6.25    RBC 4.51  3.93    Hemoglobin 13.8  12.2    Hematocrit 41.0  36.4    MCV 90.9  92.6    MCH 30.6  31.0    MCHC 33.7  33.5    RDW 12.2  12.4    Platelets 220  190    Neutrophil Rel % 59.2  56.1    Immature Granulocyte Rel % 0.2  0.0    Lymphocyte Rel % 29.5  35.8    Monocyte Rel % 9.6  6.6    Eosinophil Rel % 0.9  1.0    Basophil Rel % 0.6  0.5       Lab Results   Component Value Date    TSH 2.830 12/09/2024      Lab Results   Component Value Date    FREET4 1.14 12/09/2024      No results found for: \"DDIMERQUANT\"  Magnesium   Date Value Ref Range Status   11/01/2024 2.2 1.6 - 2.4 mg/dL Final      No results found for: \"DIGOXIN\"   No results found for: \"TROPONINT\"        Lipid Panel          7/22/2024    07:06 8/23/2024    07:09 11/1/2024    10:38   Lipid Panel   Total Cholesterol 226  196  218    Triglycerides 114  85  101    HDL Cholesterol 69  64  71    VLDL Cholesterol 20  15  18    LDL Cholesterol  137  117  129    LDL/HDL Ratio 1.94  1.80  1.79          Results for orders placed during the hospital encounter of 11/27/24    Adult Transthoracic Echo Complete W/ Cont if Necessary Per Protocol    Interpretation Summary    Left ventricular systolic function is normal. Left ventricular ejection fraction appears to be 61 - 65%.    Left ventricular diastolic function is normal.    There is mild to moderate mitral regurgitation.    Estimated right ventricular systolic pressure from tricuspid regurgitation is normal " (<35 mmHg).    Results for orders placed during the hospital encounter of 11/27/24    Stress Test With Myocardial Perfusion One Day    Interpretation Summary    Myocardial perfusion imaging indicates a normal myocardial perfusion study with no evidence of ischemia.    Left ventricular ejection fraction is normal (Calculated EF = 66%).    Diaphragmatic attenuation artifact is present.    Baseline EKG showed sinus rhythm with left bundle branch block.  There were no new ischemic changes with regadenoson infusion.    Findings consistent with a normal ECG stress test.    Impressions are consistent with a low risk study.      48-hour Holter monitor study done on 11/1/2024 showed       A relatively benign 48-hour Holter monitor study.    Underlying rhythm is sinus rhythm with an average heart rate of 76 bpm.    Occasional (203 in total) atrial premature complexes noted.  There were 3 short runs of supraventricular ectopy.  The longest run was of 2 seconds duration.    There were rare (21 in total) isolated PVCs.  There were no arrhythmias or long pauses.     Assessment and Plan   Diagnoses and all orders for this visit:    1. LBBB (left bundle branch block) (Primary)  Assessment & Plan:  Incidental finding postoperatively, she has no anginal symptoms, and SPECT stress test was negative for ischemia.  No further cardiac testing needed at this time.      2. Exertional shortness of breath  Assessment & Plan:  She is no longer complaining of any shortness of breath, she feels better after starting metoprolol.  Echocardiogram showed normal LV function, with mild to moderate mitral regurgitation.  SPECT stress test was negative for reversible ischemia.      3. Nonrheumatic mitral valve regurgitation  Assessment & Plan:  Mild to moderate mitral valve regurgitation noted on echocardiogram with normal LV function.  She has no further complaints of shortness of breath.  We will plan to repeat echocardiogram in 2 years or sooner if  she develops new or concerning symptoms.                  Follow Up   Return in about 9 months (around 9/30/2025) for with Dr. Cash.    Patient was given instructions and counseling regarding her condition or for health maintenance advice. Please see specific information pulled into the AVS if appropriate.     Signed,  Klaudia Gonzalez, APRN  12/31/2024     Dictated Utilizing Dragon Dictation: Please note that portions of this note were completed with a voice recognition program.  Part of this note may be an electronic transcription/translation of spoken language to printed text using the Dragon Dictation System.

## 2025-03-07 ENCOUNTER — OFFICE VISIT (OUTPATIENT)
Dept: FAMILY MEDICINE CLINIC | Age: 65
End: 2025-03-07
Payer: COMMERCIAL

## 2025-03-07 VITALS
TEMPERATURE: 97.8 F | DIASTOLIC BLOOD PRESSURE: 80 MMHG | WEIGHT: 141.8 LBS | BODY MASS INDEX: 24.21 KG/M2 | HEIGHT: 64 IN | HEART RATE: 76 BPM | OXYGEN SATURATION: 99 % | SYSTOLIC BLOOD PRESSURE: 118 MMHG

## 2025-03-07 DIAGNOSIS — N30.00 ACUTE CYSTITIS WITHOUT HEMATURIA: Primary | ICD-10-CM

## 2025-03-07 LAB
BILIRUB BLD-MCNC: NEGATIVE MG/DL
CLARITY, POC: ABNORMAL
COLOR UR: ABNORMAL
EXPIRATION DATE: ABNORMAL
GLUCOSE UR STRIP-MCNC: NEGATIVE MG/DL
KETONES UR QL: NEGATIVE
LEUKOCYTE EST, POC: ABNORMAL
Lab: ABNORMAL
NITRITE UR-MCNC: POSITIVE MG/ML
PH UR: 6 [PH] (ref 5–8)
PROT UR STRIP-MCNC: NEGATIVE MG/DL
RBC # UR STRIP: ABNORMAL /UL
SP GR UR: 1 (ref 1–1.03)
UROBILINOGEN UR QL: ABNORMAL

## 2025-03-07 PROCEDURE — 87086 URINE CULTURE/COLONY COUNT: CPT | Performed by: NURSE PRACTITIONER

## 2025-03-07 PROCEDURE — 99213 OFFICE O/P EST LOW 20 MIN: CPT | Performed by: NURSE PRACTITIONER

## 2025-03-07 PROCEDURE — 87186 SC STD MICRODIL/AGAR DIL: CPT | Performed by: NURSE PRACTITIONER

## 2025-03-07 PROCEDURE — 87077 CULTURE AEROBIC IDENTIFY: CPT | Performed by: NURSE PRACTITIONER

## 2025-03-07 PROCEDURE — 81003 URINALYSIS AUTO W/O SCOPE: CPT | Performed by: NURSE PRACTITIONER

## 2025-03-07 RX ORDER — PHENAZOPYRIDINE HYDROCHLORIDE 100 MG/1
100 TABLET, FILM COATED ORAL 3 TIMES DAILY PRN
Qty: 30 TABLET | Refills: 0 | Status: SHIPPED | OUTPATIENT
Start: 2025-03-07

## 2025-03-07 RX ORDER — NITROFURANTOIN 25; 75 MG/1; MG/1
100 CAPSULE ORAL 2 TIMES DAILY
Qty: 14 CAPSULE | Refills: 0 | Status: SHIPPED | OUTPATIENT
Start: 2025-03-07 | End: 2025-03-14

## 2025-03-07 NOTE — PROGRESS NOTES
Chief Complaint  Difficulty Urinating (Burning with urination, frequent urination, x1 day)    Subjective        Heather Swanson presents to [facility] today for       History of Present Illness  The patient is a 64-year-old female who presents with difficulty urinating, burning, and frequency for the last 24 hours.    She reports experiencing dysuria, characterized by a burning sensation, urinary frequency, and urgency. These symptoms have been present for the past 24 hours and are particularly pronounced during the evening hours. She describes the associated pain as severe, radiating throughout her upper body. Additionally, she has observed hematuria, although she is uncertain if this is a result of urethral irritation. Despite her attempts to avoid medication, the severity of her symptoms necessitated the use of AZO.    Supplemental Information  She is on Toprol 25 mg once a day for blood pressure and heart rate. She has a  left bundle-branch block    MEDICATIONS  Current: Toprol (metoprolol), AZO.          Current Outpatient Medications:     calcium carbonate (TUMS) 500 MG chewable tablet, Chew 1 tablet Daily., Disp: , Rfl:     cholecalciferol (VITAMIN D3) 25 MCG (1000 UT) tablet, Take 1 tablet by mouth Daily., Disp: , Rfl:     metoprolol succinate XL (TOPROL-XL) 25 MG 24 hr tablet, Take 1 tablet by mouth Every Night., Disp: 90 tablet, Rfl: 3    multivitamin with minerals tablet tablet, Take 1 tablet by mouth Daily., Disp: , Rfl:     pantoprazole (PROTONIX) 20 MG EC tablet, Take 1 tablet by mouth Daily., Disp: 90 tablet, Rfl: 3    rizatriptan MLT (MAXALT-MLT) 10 MG disintegrating tablet, Place 1 tablet on the tongue 1 (One) Time As Needed for Migraine for up to 60 doses. May repeat in 2 hours if needed, Disp: 30 tablet, Rfl: 1    valACYclovir (VALTREX) 500 MG tablet, Take 1 tablet by mouth Daily., Disp: , Rfl:     nitrofurantoin, macrocrystal-monohydrate, (Macrobid) 100 MG  "capsule, Take 1 capsule by mouth 2 (Two) Times a Day for 7 days., Disp: 14 capsule, Rfl: 0    phenazopyridine (Pyridium) 100 MG tablet, Take 1 tablet by mouth 3 (Three) Times a Day As Needed for Bladder Spasms., Disp: 30 tablet, Rfl: 0  There are no discontinued medications.      Allergies:  Diclofenac, Diflucan [fluconazole], and Morphine      Objective   Vital Signs:   Vitals:    03/07/25 1048   BP: 118/80   BP Location: Left arm   Patient Position: Sitting   Cuff Size: Adult   Pulse: 76   Temp: 97.8 °F (36.6 °C)   TempSrc: Oral   SpO2: 99%   Weight: 64.3 kg (141 lb 12.8 oz)   Height: 162.6 cm (64.02\")     Body mass index is 24.33 kg/m².  BMI is within normal parameters. No other follow-up for BMI required.        Physical Exam  Constitutional:       Appearance: Normal appearance.   Neck:      Vascular: No carotid bruit.   Cardiovascular:      Rate and Rhythm: Normal rate and regular rhythm.      Heart sounds: Normal heart sounds.   Pulmonary:      Effort: Pulmonary effort is normal.      Breath sounds: Normal breath sounds.   Musculoskeletal:         General: Normal range of motion.   Skin:     General: Skin is warm and dry.   Neurological:      General: No focal deficit present.      Mental Status: She is alert.   Psychiatric:         Mood and Affect: Mood normal.         Behavior: Behavior normal.             Lab Results   Component Value Date    GLUCOSE 86 12/09/2024    BUN 17 12/09/2024    CREATININE 0.83 12/09/2024    BCR 20.5 12/09/2024    K 4.4 12/09/2024    CO2 28.6 12/09/2024    CALCIUM 9.4 12/09/2024    ALBUMIN 4.4 12/09/2024    AST 21 12/09/2024    ALT 13 12/09/2024       Lab Results   Component Value Date    CHOL 218 (H) 11/01/2024    TRIG 101 11/01/2024    HDL 71 (H) 11/01/2024     (H) 11/01/2024       Lab Results   Component Value Date    WBC 6.25 11/01/2024    HGB 12.2 11/01/2024    HCT 36.4 11/01/2024    MCV 92.6 11/01/2024     11/01/2024           Lab Results (last 24 hours)       " Procedure Component Value Units Date/Time    POC Urinalysis Dipstick, Automated [[id number]]  (Abnormal) Collected: 03/07/25 1101    Specimen: Urine Updated: 03/07/25 1102     Color Dark Yellow     Clarity, UA Cloudy     Specific Gravity  1.005     pH, Urine 6.0     Leukocytes Small (1+)     Nitrite, UA Positive     Protein, POC Negative mg/dL      Glucose, UA Negative mg/dL      Ketones, UA Negative     Urobilinogen, UA 0.2 E.U./dL     Bilirubin Negative     Blood, UA Moderate     Lot Number [id number]     Expiration Date 10,302,025                    Procedures         Diagnoses and all orders for this visit:    1. Acute cystitis without hematuria (Primary)  -     POC Urinalysis Dipstick, Automated  -     Urine Culture - Urine, Urine, Clean Catch  -     phenazopyridine (Pyridium) 100 MG tablet; Take 1 tablet by mouth 3 (Three) Times a Day As Needed for Bladder Spasms.  Dispense: 30 tablet; Refill: 0  -     nitrofurantoin, macrocrystal-monohydrate, (Macrobid) 100 MG capsule; Take 1 capsule by mouth 2 (Two) Times a Day for 7 days.  Dispense: 14 capsule; Refill: 0          Assessment & Plan  1. Urinary Tract Infection (UTI).  She reports burning, frequency, and urgency with urination for the last 24 hours, along with some blood in the urine. A prescription for Pyridium 30 tablets has been issued, with instructions to take one tablet three times daily for a duration of 1 to 2 days. She has been informed that Pyridium will not treat the infection but will help alleviate the burning sensation. A prescription for Macrobid has also been provided, to be taken twice daily for 7 days. A urine culture will be conducted to ensure appropriate antibiotic coverage. She has been advised to maintain adequate hydration. Potential side effects of Pyridium, including turning the urine bright orange and possible staining of clothes, have been discussed. If the initial antibiotic does not cover the infection, she will be contacted with  alternative options.                Follow Up  Return if symptoms worsen or fail to improve, for If not improving or symptoms are getting worse.  Patient was given instructions and counseling regarding her condition or for health maintenance advice. Please see specific information pulled into the AVS if appropriate.           YONAS Andre  03/07/2025    Please note that portions of this document were completed using a voice recognition program.     Patient or patient representative verbalized consent for the use of Ambient Listening during the visit with  YONAS Andre for chart documentation. 3/7/2025  10:56 EST

## 2025-03-09 LAB — BACTERIA SPEC AEROBE CULT: ABNORMAL

## 2025-03-19 DIAGNOSIS — Z98.890 STATUS POST BLADDER REPAIR: Primary | ICD-10-CM

## 2025-07-11 DIAGNOSIS — K20.90 ESOPHAGITIS: ICD-10-CM

## 2025-07-11 RX ORDER — PANTOPRAZOLE SODIUM 20 MG/1
20 TABLET, DELAYED RELEASE ORAL DAILY
Qty: 90 TABLET | Refills: 0 | Status: SHIPPED | OUTPATIENT
Start: 2025-07-11

## (undated) DEVICE — SUREFIT, DUAL DISPERSIVE ELECTRODE, CONTACT QUALITY MONITOR: Brand: SUREFIT

## (undated) DEVICE — SINGLE-USE BIOPSY FORCEPS: Brand: RADIAL JAW 4

## (undated) DEVICE — SOL IRRG H2O PL/BG 1000ML STRL

## (undated) DEVICE — THE SINGLE USE ETRAP – POLYP TRAP IS USED FOR SUCTION RETRIEVAL OF ENDOSCOPICALLY REMOVED POLYPS.: Brand: ETRAP

## (undated) DEVICE — SNAR POLYP CAPTIFLX MICRO OVL 13MM 240CM

## (undated) DEVICE — COLON KIT: Brand: MEDLINE INDUSTRIES, INC.

## (undated) DEVICE — EGD OR ERCP KIT: Brand: MEDLINE INDUSTRIES, INC.

## (undated) DEVICE — Device: Brand: DEFENDO AIR/WATER/SUCTION AND BIOPSY VALVE